# Patient Record
Sex: MALE | Race: WHITE | NOT HISPANIC OR LATINO | Employment: OTHER | URBAN - METROPOLITAN AREA
[De-identification: names, ages, dates, MRNs, and addresses within clinical notes are randomized per-mention and may not be internally consistent; named-entity substitution may affect disease eponyms.]

---

## 2022-12-02 ENCOUNTER — HOSPITAL ENCOUNTER (OUTPATIENT)
Facility: HOSPITAL | Age: 87
Discharge: NURSING, REHAB OR LONG-TERM CARE | End: 2022-12-08
Attending: EMERGENCY MEDICINE | Admitting: INTERNAL MEDICINE
Payer: MEDICARE

## 2022-12-02 DIAGNOSIS — R79.89 ELEVATED TROPONIN: ICD-10-CM

## 2022-12-02 DIAGNOSIS — R53.1 WEAKNESS: ICD-10-CM

## 2022-12-02 DIAGNOSIS — R07.9 CHEST PAIN: ICD-10-CM

## 2022-12-02 DIAGNOSIS — R74.8 ELEVATED CPK: ICD-10-CM

## 2022-12-02 DIAGNOSIS — I48.91 A-FIB: ICD-10-CM

## 2022-12-02 DIAGNOSIS — R55 SYNCOPE: ICD-10-CM

## 2022-12-02 DIAGNOSIS — R55 SYNCOPE, UNSPECIFIED SYNCOPE TYPE: Primary | ICD-10-CM

## 2022-12-02 PROBLEM — D51.9 ANEMIA DUE TO VITAMIN B12 DEFICIENCY: Status: ACTIVE | Noted: 2022-12-02

## 2022-12-02 PROBLEM — E03.9 HYPOTHYROIDISM: Chronic | Status: ACTIVE | Noted: 2022-12-02

## 2022-12-02 PROBLEM — N17.9 AKI (ACUTE KIDNEY INJURY): Status: ACTIVE | Noted: 2022-12-02

## 2022-12-02 PROBLEM — D64.9 ANEMIA: Chronic | Status: ACTIVE | Noted: 2022-12-02

## 2022-12-02 LAB
ALBUMIN SERPL BCP-MCNC: 3.1 G/DL (ref 3.5–5.2)
ALP SERPL-CCNC: 62 U/L (ref 55–135)
ALT SERPL W/O P-5'-P-CCNC: 16 U/L (ref 10–44)
ANION GAP SERPL CALC-SCNC: 13 MMOL/L (ref 8–16)
AST SERPL-CCNC: 82 U/L (ref 10–40)
BASOPHILS # BLD AUTO: 0.01 K/UL (ref 0–0.2)
BASOPHILS NFR BLD: 0.1 % (ref 0–1.9)
BILIRUB SERPL-MCNC: 1 MG/DL (ref 0.1–1)
BNP SERPL-MCNC: 408 PG/ML (ref 0–99)
BUN SERPL-MCNC: 48 MG/DL (ref 10–30)
CALCIUM SERPL-MCNC: 8.7 MG/DL (ref 8.7–10.5)
CHLORIDE SERPL-SCNC: 110 MMOL/L (ref 95–110)
CK SERPL-CCNC: 2714 U/L (ref 20–200)
CO2 SERPL-SCNC: 16 MMOL/L (ref 23–29)
CREAT SERPL-MCNC: 1.8 MG/DL (ref 0.5–1.4)
DIFFERENTIAL METHOD: ABNORMAL
EOSINOPHIL # BLD AUTO: 0 K/UL (ref 0–0.5)
EOSINOPHIL NFR BLD: 0 % (ref 0–8)
ERYTHROCYTE [DISTWIDTH] IN BLOOD BY AUTOMATED COUNT: 14.8 % (ref 11.5–14.5)
EST. GFR  (NO RACE VARIABLE): 34 ML/MIN/1.73 M^2
GLUCOSE SERPL-MCNC: 113 MG/DL (ref 70–110)
HCT VFR BLD AUTO: 31.3 % (ref 40–54)
HGB BLD-MCNC: 10.2 G/DL (ref 14–18)
IMM GRANULOCYTES # BLD AUTO: 0.02 K/UL (ref 0–0.04)
IMM GRANULOCYTES NFR BLD AUTO: 0.2 % (ref 0–0.5)
LYMPHOCYTES # BLD AUTO: 0.2 K/UL (ref 1–4.8)
LYMPHOCYTES NFR BLD: 2.8 % (ref 18–48)
MCH RBC QN AUTO: 30.2 PG (ref 27–31)
MCHC RBC AUTO-ENTMCNC: 32.6 G/DL (ref 32–36)
MCV RBC AUTO: 93 FL (ref 82–98)
MONOCYTES # BLD AUTO: 0.6 K/UL (ref 0.3–1)
MONOCYTES NFR BLD: 6.9 % (ref 4–15)
NEUTROPHILS # BLD AUTO: 7.8 K/UL (ref 1.8–7.7)
NEUTROPHILS NFR BLD: 90 % (ref 38–73)
NRBC BLD-RTO: 0 /100 WBC
PLATELET # BLD AUTO: 241 K/UL (ref 150–450)
PMV BLD AUTO: 10.6 FL (ref 9.2–12.9)
POTASSIUM SERPL-SCNC: 3.7 MMOL/L (ref 3.5–5.1)
PROT SERPL-MCNC: 6.4 G/DL (ref 6–8.4)
RBC # BLD AUTO: 3.38 M/UL (ref 4.6–6.2)
SODIUM SERPL-SCNC: 139 MMOL/L (ref 136–145)
TROPONIN I SERPL DL<=0.01 NG/ML-MCNC: 0.04 NG/ML (ref 0–0.03)
TROPONIN I SERPL DL<=0.01 NG/ML-MCNC: 0.04 NG/ML (ref 0–0.03)
TROPONIN I SERPL DL<=0.01 NG/ML-MCNC: 0.06 NG/ML (ref 0–0.03)
WBC # BLD AUTO: 8.7 K/UL (ref 3.9–12.7)

## 2022-12-02 PROCEDURE — 96361 HYDRATE IV INFUSION ADD-ON: CPT

## 2022-12-02 PROCEDURE — 25000003 PHARM REV CODE 250: Performed by: EMERGENCY MEDICINE

## 2022-12-02 PROCEDURE — 80053 COMPREHEN METABOLIC PANEL: CPT | Performed by: EMERGENCY MEDICINE

## 2022-12-02 PROCEDURE — 85025 COMPLETE CBC W/AUTO DIFF WBC: CPT | Performed by: EMERGENCY MEDICINE

## 2022-12-02 PROCEDURE — 93010 ELECTROCARDIOGRAM REPORT: CPT | Mod: ,,, | Performed by: STUDENT IN AN ORGANIZED HEALTH CARE EDUCATION/TRAINING PROGRAM

## 2022-12-02 PROCEDURE — 93010 EKG 12-LEAD: ICD-10-PCS | Mod: ,,, | Performed by: STUDENT IN AN ORGANIZED HEALTH CARE EDUCATION/TRAINING PROGRAM

## 2022-12-02 PROCEDURE — 99285 EMERGENCY DEPT VISIT HI MDM: CPT | Mod: 25,CS

## 2022-12-02 PROCEDURE — 84484 ASSAY OF TROPONIN QUANT: CPT | Performed by: EMERGENCY MEDICINE

## 2022-12-02 PROCEDURE — G0378 HOSPITAL OBSERVATION PER HR: HCPCS

## 2022-12-02 PROCEDURE — 63600175 PHARM REV CODE 636 W HCPCS: Performed by: INTERNAL MEDICINE

## 2022-12-02 PROCEDURE — 84484 ASSAY OF TROPONIN QUANT: CPT | Mod: 91 | Performed by: INTERNAL MEDICINE

## 2022-12-02 PROCEDURE — 93005 ELECTROCARDIOGRAM TRACING: CPT

## 2022-12-02 PROCEDURE — 36415 COLL VENOUS BLD VENIPUNCTURE: CPT | Performed by: INTERNAL MEDICINE

## 2022-12-02 PROCEDURE — 80061 LIPID PANEL: CPT | Performed by: INTERNAL MEDICINE

## 2022-12-02 PROCEDURE — 82550 ASSAY OF CK (CPK): CPT | Performed by: EMERGENCY MEDICINE

## 2022-12-02 PROCEDURE — 83880 ASSAY OF NATRIURETIC PEPTIDE: CPT | Performed by: EMERGENCY MEDICINE

## 2022-12-02 RX ORDER — LEVOTHYROXINE SODIUM 112 UG/1
112 TABLET ORAL
COMMUNITY

## 2022-12-02 RX ORDER — LANOLIN ALCOHOL/MO/W.PET/CERES
400 CREAM (GRAM) TOPICAL DAILY
Status: DISCONTINUED | OUTPATIENT
Start: 2022-12-03 | End: 2022-12-06

## 2022-12-02 RX ORDER — ERGOCALCIFEROL 1.25 MG/1
50000 CAPSULE ORAL
COMMUNITY

## 2022-12-02 RX ORDER — SODIUM CHLORIDE, SODIUM LACTATE, POTASSIUM CHLORIDE, CALCIUM CHLORIDE 600; 310; 30; 20 MG/100ML; MG/100ML; MG/100ML; MG/100ML
INJECTION, SOLUTION INTRAVENOUS CONTINUOUS
Status: DISCONTINUED | OUTPATIENT
Start: 2022-12-02 | End: 2022-12-07

## 2022-12-02 RX ORDER — LANOLIN ALCOHOL/MO/W.PET/CERES
400 CREAM (GRAM) TOPICAL DAILY
COMMUNITY

## 2022-12-02 RX ORDER — LEVOTHYROXINE SODIUM 112 UG/1
112 TABLET ORAL
Status: DISCONTINUED | OUTPATIENT
Start: 2022-12-03 | End: 2022-12-08 | Stop reason: HOSPADM

## 2022-12-02 RX ORDER — ACETAMINOPHEN 325 MG/1
650 TABLET ORAL EVERY 4 HOURS PRN
Status: DISCONTINUED | OUTPATIENT
Start: 2022-12-02 | End: 2022-12-08 | Stop reason: HOSPADM

## 2022-12-02 RX ADMIN — SODIUM CHLORIDE, POTASSIUM CHLORIDE, SODIUM LACTATE AND CALCIUM CHLORIDE: 600; 310; 30; 20 INJECTION, SOLUTION INTRAVENOUS at 09:12

## 2022-12-02 RX ADMIN — SODIUM CHLORIDE 1000 ML: 0.9 INJECTION, SOLUTION INTRAVENOUS at 04:12

## 2022-12-02 NOTE — ED PROVIDER NOTES
Encounter Date: 2022    SCRIBE #1 NOTE: I, My Venkat, am scribing for, and in the presence of,  Mykel Licea MD. I have scribed the entire note.     History     Chief Complaint   Patient presents with    Loss of Consciousness     Pt. Reports he as been passing out all day. EMS says pt. Was hypotensive on arrival.      EMS was called for weakness, and multiple syncopal events today.  When they arrived, systolic was 80.  Gave 1 liter of fluids, and BP improved to 140 systolic.  No complaints at present.      The history is provided by the patient.   Loss of Consciousness  This is a new problem. The current episode started less than 1 hour ago. The problem has been resolved. Pertinent negatives include no chest pain, no abdominal pain, no headaches and no shortness of breath.   Review of patient's allergies indicates:   Allergen Reactions    Azithromycin Other (See Comments)     not sure of reaction    Donepezil Other (See Comments)     Leg cramps, sedation     Past Medical History:   Diagnosis Date    COPD (chronic obstructive pulmonary disease)     Thyroid disease      Past Surgical History:   Procedure Laterality Date    APPENDECTOMY      SPINE SURGERY       Family History   Problem Relation Age of Onset    Heart failure Mother     Emphysema Father      Social History     Tobacco Use    Smoking status: Former     Types: Cigarettes     Quit date:      Years since quittin.9    Smokeless tobacco: Never   Substance Use Topics    Alcohol use: Not Currently    Drug use: Never     Review of Systems   Constitutional:  Negative for fever.   HENT:  Negative for sore throat.    Respiratory:  Negative for shortness of breath.    Cardiovascular:  Positive for syncope. Negative for chest pain.   Gastrointestinal:  Negative for abdominal pain and nausea.   Genitourinary:  Negative for dysuria.   Musculoskeletal:  Negative for back pain.   Skin:  Negative for rash.   Neurological:  Negative for weakness and headaches.    Hematological:  Does not bruise/bleed easily.     Physical Exam     Initial Vitals [12/02/22 1428]   BP Pulse Resp Temp SpO2   (!) 140/80 74 18 97.9 °F (36.6 °C) 96 %      MAP       --         Physical Exam    Nursing note and vitals reviewed.  Constitutional: He appears well-developed and well-nourished. No distress.   Elderly and frail.   HENT:   Head: Normocephalic and atraumatic.   Mouth/Throat: Oropharynx is clear and moist.   Eyes: Conjunctivae and EOM are normal. Pupils are equal, round, and reactive to light.   Neck: Neck supple.   Normal range of motion.  Cardiovascular:  Normal rate, regular rhythm and normal heart sounds.     Exam reveals no gallop and no friction rub.       No murmur heard.  Pulmonary/Chest: Breath sounds normal. No respiratory distress. He has no wheezes. He has no rhonchi. He has no rales.   Abdominal: Abdomen is soft. Bowel sounds are normal. He exhibits no distension and no mass. There is no abdominal tenderness. There is no rebound and no guarding.   Musculoskeletal:         General: No edema. Normal range of motion.      Cervical back: Normal range of motion and neck supple.     Neurological: He is alert and oriented to person, place, and time. He has normal strength.   Skin: Skin is warm and dry. No rash noted.   Psychiatric: He has a normal mood and affect. Thought content normal.       ED Course   Procedures  Labs Reviewed   CBC W/ AUTO DIFFERENTIAL - Abnormal; Notable for the following components:       Result Value    RBC 3.38 (*)     Hemoglobin 10.2 (*)     Hematocrit 31.3 (*)     RDW 14.8 (*)     Gran # (ANC) 7.8 (*)     Lymph # 0.2 (*)     Gran % 90.0 (*)     Lymph % 2.8 (*)     All other components within normal limits   COMPREHENSIVE METABOLIC PANEL - Abnormal; Notable for the following components:    CO2 16 (*)     Glucose 113 (*)     BUN 48 (*)     Creatinine 1.8 (*)     Albumin 3.1 (*)     AST 82 (*)     eGFR 34 (*)     All other components within normal limits    B-TYPE NATRIURETIC PEPTIDE - Abnormal; Notable for the following components:     (*)     All other components within normal limits   CK - Abnormal; Notable for the following components:    CPK 2714 (*)     All other components within normal limits   TROPONIN I - Abnormal; Notable for the following components:    Troponin I 0.058 (*)     All other components within normal limits   URINALYSIS, REFLEX TO URINE CULTURE   LIPID PANEL     EKG Readings: (Independently Interpreted)   Rhythm: Normal Sinus Rhythm. Heart Rate: 76. Ectopy: No Ectopy. Conduction: Normal. ST Segments: Normal ST Segments. T Waves: Normal. Clinical Impression: Normal Sinus Rhythm   ECG Results              EKG 12-lead (In process)  Result time 12/02/22 15:25:02      In process by Interface, Lab In [facility] (12/02/22 15:25:02)                   Narrative:    Test Reason : R53.1,    Vent. Rate : 076 BPM     Atrial Rate : 076 BPM     P-R Int : 162 ms          QRS Dur : 084 ms      QT Int : 402 ms       P-R-T Axes : 029 090 034 degrees     QTc Int : 452 ms    Undetermined rhythm  Rightward axis  Borderline Abnormal ECG  No previous ECGs available    Referred By: [email or web address]   SELF           Confirmed By:                                   Imaging Results              CT Head Without Contrast (Final result)  Result time 12/02/22 16:52:27      Final result by Tao Huerta MD (12/02/22 16:52:27)                   Impression:      No acute abnormality.    All CT scans at this facility are performed  using dose modulation techniques as appropriate to performed exam including the following:  automated exposure control; adjustment of mA and/or kV according to the patients size (this includes techniques or standardized protocols for targeted exams where dose is matched to indication/reason for exam: i.e. extremities or head);  iterative reconstruction technique.      Electronically signed by: Tao Huerta  Date:    12/02/2022  Time:    16:52                Narrative:    EXAMINATION:  CT HEAD WITHOUT CONTRAST    CLINICAL HISTORY:  Syncope, recurrent;    TECHNIQUE:  Low dose axial CT images obtained throughout the head without intravenous contrast. Sagittal and coronal reconstructions were performed.    COMPARISON:  None.    FINDINGS:  Intracranial compartment:    Motion degrades the evaluation.    Ventricles and sulci are normal in size for age without evidence of hydrocephalus. No extra-axial blood or fluid collections.    Mild microvascular ischemic change.  No parenchymal mass, hemorrhage, edema or major vascular distribution infarct.    Skull/extracranial contents (limited evaluation): No fracture. Mastoid air cells and paranasal sinuses are essentially clear.                                       X-Ray Chest AP Portable (Final result)  Result time 12/02/22 15:25:06      Final result by JOÃO Travis Sr., MD (12/02/22 15:25:06)                   Impression:      1. This is a limited examination secondary to the lack of inclusion of the entire thorax on the film. The left costophrenic angle was not included on the film.  2. There is no focal pulmonary infiltrate visualized.  3. There is a moderate amount of atherosclerosis.  .      Electronically signed by: Franklin Travis MD  Date:    12/02/2022  Time:    15:25               Narrative:    EXAMINATION:  XR CHEST AP PORTABLE    CLINICAL HISTORY:  weakness;    COMPARISON:  None    FINDINGS:  This is a limited examination secondary to the lack of inclusion of the entire thorax on the film.  There is no focal pulmonary infiltrate visualized.  The size of the heart is normal.  There is a moderate amount of atherosclerosis.  There is no pneumothorax.  The right costophrenic angle is sharp.  The left costophrenic angle was not included on the film.                                       Medications   acetaminophen tablet 650 mg (has no administration in time range)   lactated ringers infusion ( Intravenous New  Bag 12/2/22 5929)   levothyroxine tablet 112 mcg (has no administration in time range)   magnesium oxide tablet 400 mg (has no administration in time range)   sodium chloride 0.9% bolus 1,000 mL (0 mLs Intravenous Stopped 12/2/22 1714)     Medical Decision Making:   Clinical Tests:   Lab Tests: Ordered and Reviewed  Radiological Study: Ordered and Reviewed  Medical Tests: Ordered and ReviewedLab results:  Results for orders placed or performed during the hospital encounter of 12/02/22   CBC Auto Differential   Result Value Ref Range    WBC 8.70 3.90 - 12.70 K/uL    RBC 3.38 (L) 4.60 - 6.20 M/uL    Hemoglobin 10.2 (L) 14.0 - 18.0 g/dL    Hematocrit 31.3 (L) 40.0 - 54.0 %    MCV 93 82 - 98 fL    MCH 30.2 27.0 - 31.0 pg    MCHC 32.6 32.0 - 36.0 g/dL    RDW 14.8 (H) 11.5 - 14.5 %    Platelets 241 150 - 450 K/uL    MPV 10.6 9.2 - 12.9 fL    Immature Granulocytes 0.2 0.0 - 0.5 %    Gran # (ANC) 7.8 (H) 1.8 - 7.7 K/uL    Immature Grans (Abs) 0.02 0.00 - 0.04 K/uL    Lymph # 0.2 (L) 1.0 - 4.8 K/uL    Mono # 0.6 0.3 - 1.0 K/uL    Eos # 0.0 0.0 - 0.5 K/uL    Baso # 0.01 0.00 - 0.20 K/uL    nRBC 0 0 /100 WBC    Gran % 90.0 (H) 38.0 - 73.0 %    Lymph % 2.8 (L) 18.0 - 48.0 %    Mono % 6.9 4.0 - 15.0 %    Eosinophil % 0.0 0.0 - 8.0 %    Basophil % 0.1 0.0 - 1.9 %    Differential Method Automated    Comprehensive Metabolic Panel   Result Value Ref Range    Sodium 139 136 - 145 mmol/L    Potassium 3.7 3.5 - 5.1 mmol/L    Chloride 110 95 - 110 mmol/L    CO2 16 (L) 23 - 29 mmol/L    Glucose 113 (H) 70 - 110 mg/dL    BUN 48 (H) 10 - 30 mg/dL    Creatinine 1.8 (H) 0.5 - 1.4 mg/dL    Calcium 8.7 8.7 - 10.5 mg/dL    Total Protein 6.4 6.0 - 8.4 g/dL    Albumin 3.1 (L) 3.5 - 5.2 g/dL    Total Bilirubin 1.0 0.1 - 1.0 mg/dL    Alkaline Phosphatase 62 55 - 135 U/L    AST 82 (H) 10 - 40 U/L    ALT 16 10 - 44 U/L    Anion Gap 13 8 - 16 mmol/L    eGFR 34 (A) >60 mL/min/1.73 m^2   BNP   Result Value Ref Range     (H) 0 - 99 pg/mL   CK    Result Value Ref Range    CPK 2714 (H) 20 - 200 U/L   Troponin I   Result Value Ref Range    Troponin I 0.058 (H) 0.000 - 0.026 ng/mL   Troponin I   Result Value Ref Range    Troponin I 0.042 (H) 0.000 - 0.026 ng/mL   Troponin I   Result Value Ref Range    Troponin I 0.044 (H) 0.000 - 0.026 ng/mL      5:23 PM: Discussed case with Dr. Joesph MD ([facility]). Dr. Pink agrees with current care and management of pt and accepts admission.   Admitting Service: Hospital Medicine  Admitting Physician: Dr. Pink  Admit to: Obs  5:24 PM: Re-evaluated pt. Pt is resting comfortably and is in no acute distress.  Pt states .  D/w pt all pertinent results. D/w pt any concerns expressed at this time. Answered all questions. Pt expresses understanding at this time.       Scribe Attestation:   Scribe #1: I performed the above scribed service and the documentation accurately describes the services I performed. I attest to the accuracy of the note.    Attending Attestation:           Physician Attestation for Scribe:  Physician Attestation Statement for Scribe #1: I, Mykel Licea MD, reviewed documentation, as scribed by My Venkat in my presence, and it is both accurate and complete.                        Clinical Impression:   Final diagnoses:  [R53.1] Weakness  [R55] Syncope, unspecified syncope type (Primary)  [R77.8] Elevated troponin  [R74.8] Elevated CPK        ED Disposition Condition    Observation Stable                Mykel Licea MD  12/03/22 0150

## 2022-12-02 NOTE — Clinical Note
Diagnosis: Weakness [[id number]]   Future Attending Provider: BETH MOSQUEDA [[id number]]   Admitting Provider:: BETH MOSQUEDA [[id number]]  
no

## 2022-12-03 LAB
ALBUMIN SERPL BCP-MCNC: 3 G/DL (ref 3.5–5.2)
ALP SERPL-CCNC: 63 U/L (ref 55–135)
ALT SERPL W/O P-5'-P-CCNC: 17 U/L (ref 10–44)
ANION GAP SERPL CALC-SCNC: 12 MMOL/L (ref 8–16)
AORTIC ROOT ANNULUS: 3.7 CM
ASCENDING AORTA: 3.99 CM
AST SERPL-CCNC: 62 U/L (ref 10–40)
AV INDEX (PROSTH): 0.78
AV MEAN GRADIENT: 9 MMHG
AV PEAK GRADIENT: 16 MMHG
AV REGURGITATION PRESSURE HALF TIME: 568.2 MS
AV VALVE AREA: 2.79 CM2
AV VELOCITY RATIO: 0.74
BACTERIA #/AREA URNS HPF: ABNORMAL /HPF
BASOPHILS # BLD AUTO: 0.01 K/UL (ref 0–0.2)
BASOPHILS NFR BLD: 0.1 % (ref 0–1.9)
BILIRUB SERPL-MCNC: 0.9 MG/DL (ref 0.1–1)
BILIRUB UR QL STRIP: NEGATIVE
BSA FOR ECHO PROCEDURE: 1.82 M2
BUN SERPL-MCNC: 43 MG/DL (ref 10–30)
CALCIUM SERPL-MCNC: 8.5 MG/DL (ref 8.7–10.5)
CHLORIDE SERPL-SCNC: 107 MMOL/L (ref 95–110)
CHOLEST SERPL-MCNC: 138 MG/DL (ref 120–199)
CHOLEST SERPL-MCNC: 147 MG/DL (ref 120–199)
CHOLEST/HDLC SERPL: 3.6 {RATIO} (ref 2–5)
CHOLEST/HDLC SERPL: 4.3 {RATIO} (ref 2–5)
CK SERPL-CCNC: 1129 U/L (ref 20–200)
CLARITY UR: CLEAR
CO2 SERPL-SCNC: 20 MMOL/L (ref 23–29)
COLOR UR: YELLOW
CREAT SERPL-MCNC: 1.6 MG/DL (ref 0.5–1.4)
CV ECHO LV RWT: 1.05 CM
DIFFERENTIAL METHOD: ABNORMAL
DOP CALC AO PEAK VEL: 2.02 M/S
DOP CALC AO VTI: 43.3 CM
DOP CALC LVOT AREA: 3.6 CM2
DOP CALC LVOT DIAMETER: 2.13 CM
DOP CALC LVOT PEAK VEL: 1.5 M/S
DOP CALC LVOT STROKE VOLUME: 120.73 CM3
DOP CALC RVOT PEAK VEL: 1.21 M/S
DOP CALC RVOT VTI: 26.3 CM
DOP CALCLVOT PEAK VEL VTI: 33.9 CM
E WAVE DECELERATION TIME: 311.18 MSEC
E/A RATIO: 0.9
ECHO LV POSTERIOR WALL: 1.2 CM (ref 0.6–1.1)
EJECTION FRACTION: 60 %
EOSINOPHIL # BLD AUTO: 0 K/UL (ref 0–0.5)
EOSINOPHIL NFR BLD: 0 % (ref 0–8)
ERYTHROCYTE [DISTWIDTH] IN BLOOD BY AUTOMATED COUNT: 14.7 % (ref 11.5–14.5)
EST. GFR  (NO RACE VARIABLE): 39 ML/MIN/1.73 M^2
FRACTIONAL SHORTENING: 28 % (ref 28–44)
GLUCOSE SERPL-MCNC: 96 MG/DL (ref 70–110)
GLUCOSE UR QL STRIP: NEGATIVE
GRAN CASTS #/AREA URNS LPF: 3 /LPF
HCT VFR BLD AUTO: 28.4 % (ref 40–54)
HDLC SERPL-MCNC: 32 MG/DL (ref 40–75)
HDLC SERPL-MCNC: 41 MG/DL (ref 40–75)
HDLC SERPL: 23.2 % (ref 20–50)
HDLC SERPL: 27.9 % (ref 20–50)
HGB BLD-MCNC: 9.4 G/DL (ref 14–18)
HGB UR QL STRIP: ABNORMAL
HYALINE CASTS #/AREA URNS LPF: 0 /LPF
IMM GRANULOCYTES # BLD AUTO: 0.03 K/UL (ref 0–0.04)
IMM GRANULOCYTES NFR BLD AUTO: 0.4 % (ref 0–0.5)
INTERVENTRICULAR SEPTUM: 1.39 CM (ref 0.6–1.1)
IVRT: 122.74 MSEC
KETONES UR QL STRIP: NEGATIVE
LA MAJOR: 7.75 CM
LA MINOR: 6.16 CM
LA WIDTH: 4.5 CM
LDLC SERPL CALC-MCNC: 91.2 MG/DL (ref 63–159)
LDLC SERPL CALC-MCNC: 92.2 MG/DL (ref 63–159)
LEFT ATRIUM SIZE: 2.68 CM
LEFT ATRIUM VOLUME INDEX: 38.7 ML/M2
LEFT ATRIUM VOLUME: 70.36 CM3
LEFT INTERNAL DIMENSION IN SYSTOLE: 1.64 CM (ref 2.1–4)
LEFT VENTRICLE DIASTOLIC VOLUME INDEX: 9.77 ML/M2
LEFT VENTRICLE DIASTOLIC VOLUME: 17.78 ML
LEFT VENTRICLE MASS INDEX: 48 G/M2
LEFT VENTRICLE SYSTOLIC VOLUME INDEX: 4.2 ML/M2
LEFT VENTRICLE SYSTOLIC VOLUME: 7.69 ML
LEFT VENTRICULAR INTERNAL DIMENSION IN DIASTOLE: 2.28 CM (ref 3.5–6)
LEFT VENTRICULAR MASS: 86.84 G
LEUKOCYTE ESTERASE UR QL STRIP: NEGATIVE
LVOT MG: 5.92 MMHG
LVOT MV: 1.15 CM/S
LYMPHOCYTES # BLD AUTO: 0.4 K/UL (ref 1–4.8)
LYMPHOCYTES NFR BLD: 5.2 % (ref 18–48)
MCH RBC QN AUTO: 30.7 PG (ref 27–31)
MCHC RBC AUTO-ENTMCNC: 33.1 G/DL (ref 32–36)
MCV RBC AUTO: 93 FL (ref 82–98)
MICROSCOPIC COMMENT: ABNORMAL
MONOCYTES # BLD AUTO: 0.5 K/UL (ref 0.3–1)
MONOCYTES NFR BLD: 8 % (ref 4–15)
MV PEAK A VEL: 0.94 M/S
MV PEAK E VEL: 0.85 M/S
MV STENOSIS PRESSURE HALF TIME: 90.24 MS
MV VALVE AREA P 1/2 METHOD: 2.44 CM2
NEUTROPHILS # BLD AUTO: 5.8 K/UL (ref 1.8–7.7)
NEUTROPHILS NFR BLD: 86.3 % (ref 38–73)
NITRITE UR QL STRIP: NEGATIVE
NONHDLC SERPL-MCNC: 106 MG/DL
NONHDLC SERPL-MCNC: 106 MG/DL
NRBC BLD-RTO: 0 /100 WBC
PH UR STRIP: 6 [PH] (ref 5–8)
PISA AR MAX VEL: 3.67 M/S
PISA TR MAX VEL: 3.54 M/S
PLATELET # BLD AUTO: 189 K/UL (ref 150–450)
PMV BLD AUTO: 10.5 FL (ref 9.2–12.9)
POTASSIUM SERPL-SCNC: 3.8 MMOL/L (ref 3.5–5.1)
PROT SERPL-MCNC: 6 G/DL (ref 6–8.4)
PROT UR QL STRIP: ABNORMAL
PULM VEIN S/D RATIO: 1.51
PV MEAN GRADIENT: 2.93 MMHG
PV MV: 0.85 M/S
PV PEAK D VEL: 0.39 M/S
PV PEAK S VEL: 0.59 M/S
PV PEAK VELOCITY: 1.47 CM/S
RA MAJOR: 5.19 CM
RBC # BLD AUTO: 3.06 M/UL (ref 4.6–6.2)
RBC #/AREA URNS HPF: 4 /HPF (ref 0–4)
RIGHT VENTRICULAR END-DIASTOLIC DIMENSION: 3.1 CM
SODIUM SERPL-SCNC: 139 MMOL/L (ref 136–145)
SP GR UR STRIP: 1.02 (ref 1–1.03)
SQUAMOUS #/AREA URNS HPF: 2 /HPF
STJ: 3.76 CM
TR MAX PG: 50 MMHG
TRICUSPID ANNULAR PLANE SYSTOLIC EXCURSION: 2.1 CM
TRIGL SERPL-MCNC: 69 MG/DL (ref 30–150)
TRIGL SERPL-MCNC: 74 MG/DL (ref 30–150)
TROPONIN I SERPL DL<=0.01 NG/ML-MCNC: 0.04 NG/ML (ref 0–0.03)
URN SPEC COLLECT METH UR: ABNORMAL
UROBILINOGEN UR STRIP-ACNC: NEGATIVE EU/DL
WBC # BLD AUTO: 6.71 K/UL (ref 3.9–12.7)
WBC #/AREA URNS HPF: 3 /HPF (ref 0–5)
WBC CLUMPS URNS QL MICRO: ABNORMAL

## 2022-12-03 PROCEDURE — G0378 HOSPITAL OBSERVATION PER HR: HCPCS

## 2022-12-03 PROCEDURE — 97162 PT EVAL MOD COMPLEX 30 MIN: CPT

## 2022-12-03 PROCEDURE — 82550 ASSAY OF CK (CPK): CPT | Performed by: NURSE PRACTITIONER

## 2022-12-03 PROCEDURE — 92610 EVALUATE SWALLOWING FUNCTION: CPT

## 2022-12-03 PROCEDURE — 36415 COLL VENOUS BLD VENIPUNCTURE: CPT | Performed by: NURSE PRACTITIONER

## 2022-12-03 PROCEDURE — 80053 COMPREHEN METABOLIC PANEL: CPT | Performed by: INTERNAL MEDICINE

## 2022-12-03 PROCEDURE — 36415 COLL VENOUS BLD VENIPUNCTURE: CPT | Performed by: INTERNAL MEDICINE

## 2022-12-03 PROCEDURE — 97530 THERAPEUTIC ACTIVITIES: CPT

## 2022-12-03 PROCEDURE — 84484 ASSAY OF TROPONIN QUANT: CPT | Performed by: INTERNAL MEDICINE

## 2022-12-03 PROCEDURE — 80061 LIPID PANEL: CPT | Performed by: INTERNAL MEDICINE

## 2022-12-03 PROCEDURE — 97167 OT EVAL HIGH COMPLEX 60 MIN: CPT

## 2022-12-03 PROCEDURE — 96361 HYDRATE IV INFUSION ADD-ON: CPT

## 2022-12-03 PROCEDURE — 81000 URINALYSIS NONAUTO W/SCOPE: CPT | Performed by: EMERGENCY MEDICINE

## 2022-12-03 PROCEDURE — 25000003 PHARM REV CODE 250: Performed by: INTERNAL MEDICINE

## 2022-12-03 PROCEDURE — 85025 COMPLETE CBC W/AUTO DIFF WBC: CPT | Performed by: INTERNAL MEDICINE

## 2022-12-03 PROCEDURE — 63600175 PHARM REV CODE 636 W HCPCS: Performed by: INTERNAL MEDICINE

## 2022-12-03 RX ADMIN — LEVOTHYROXINE SODIUM 112 MCG: 0.11 TABLET ORAL at 05:12

## 2022-12-03 RX ADMIN — SODIUM CHLORIDE, POTASSIUM CHLORIDE, SODIUM LACTATE AND CALCIUM CHLORIDE: 600; 310; 30; 20 INJECTION, SOLUTION INTRAVENOUS at 04:12

## 2022-12-03 RX ADMIN — Medication 400 MG: at 08:12

## 2022-12-03 NOTE — ASSESSMENT & PLAN NOTE
Unwitnessed, unclear etiology  -Orthostatic VS every shift  -Neuro checks every 4 hours  -Serial troponin levels trending down  - ECHO pending

## 2022-12-03 NOTE — ASSESSMENT & PLAN NOTE
Patient with history of dementia as chart review  Family reports that patient has had increasing moments of confusion recently, however still lucid most of the time  Apparently had previously been on donepezil  Supportive care   Delirium precautions  PT/OT/SLP

## 2022-12-03 NOTE — SUBJECTIVE & OBJECTIVE
Past Medical History:   Diagnosis Date    COPD (chronic obstructive pulmonary disease)     Thyroid disease        Past Surgical History:   Procedure Laterality Date    APPENDECTOMY      SPINE SURGERY         Review of patient's allergies indicates:   Allergen Reactions    Azithromycin Other (See Comments)     not sure of reaction    Donepezil Other (See Comments)     Leg cramps, sedation       No current facility-administered medications on file prior to encounter.     Current Outpatient Medications on File Prior to Encounter   Medication Sig    CYANOCOBALAMIN, VITAMIN B-12, INJ Inject 1,000 mcg into the muscle every 30 days.    ergocalciferol (ERGOCALCIFEROL) 50,000 unit Cap Take 50,000 Units by mouth every 7 days.    levothyroxine (SYNTHROID) 112 MCG tablet Take 112 mcg by mouth before breakfast.    magnesium oxide (MAG-OX) 400 mg (241.3 mg magnesium) tablet Take 400 mg by mouth once daily.     Family History       Problem Relation (Age of Onset)    Emphysema Father    Heart failure Mother          Tobacco Use    Smoking status: Former     Types: Cigarettes     Quit date:      Years since quittin.9    Smokeless tobacco: Never   Substance and Sexual Activity    Alcohol use: Not Currently    Drug use: Never    Sexual activity: Not Currently     Review of Systems   Unable to perform ROS: Other  Hughes  Objective:     Vital Signs (Most Recent):  Temp: 97.9 °F (36.6 °C) (22 1428)  Pulse: 100 (22)  Resp: 18 (22)  BP: (!) 125/91 (22)  SpO2: 97 % (22)   Vital Signs (24h Range):  Temp:  [97.9 °F (36.6 °C)] 97.9 °F (36.6 °C)  Pulse:  [] 100  Resp:  [17-20] 18  SpO2:  [92 %-97 %] 97 %  BP: (114-141)/(71-91) 125/91     Weight: 63.5 kg (140 lb)  Body mass index is 21.29 kg/m².    Physical Exam  Vitals and nursing note reviewed. Exam conducted with a chaperone present.   Constitutional:       General: He is not in acute distress.  HENT:      Head: Normocephalic and  atraumatic.      Right Ear: External ear normal.      Left Ear: External ear normal.      Nose: Nose normal.      Mouth/Throat:      Mouth: Mucous membranes are dry.   Eyes:      Extraocular Movements: Extraocular movements intact.      Pupils: Pupils are equal, round, and reactive to light.   Cardiovascular:      Rate and Rhythm: Normal rate and regular rhythm.   Pulmonary:      Effort: Pulmonary effort is normal. No respiratory distress.      Breath sounds: No wheezing.      Comments: On room air  Abdominal:      General: Bowel sounds are normal.      Palpations: Abdomen is soft.      Tenderness: There is no abdominal tenderness. There is no guarding or rebound.   Musculoskeletal:      Cervical back: Neck supple.      Right lower leg: No edema.      Left lower leg: No edema.      Comments: Venous stasis changes on bilateral lower extremities   Skin:     General: Skin is warm and dry.      Comments: Abrasion on right elbow, with scab.  Multiple abrasions on lower extremities   Neurological:      Mental Status: He is alert. Mental status is at baseline.      Comments: Occasionally goes on tangent well conversing   Psychiatric:         Mood and Affect: Mood normal.         CRANIAL NERVES     CN III, IV, VI   Pupils are equal, round, and reactive to light.     Significant Labs: All pertinent labs within the past 24 hours have been reviewed.  CBC:   Recent Labs   Lab 12/02/22  1510   WBC 8.70   HGB 10.2*   HCT 31.3*        CMP:   Recent Labs   Lab 12/02/22  1510      K 3.7      CO2 16*   *   BUN 48*   CREATININE 1.8*   CALCIUM 8.7   PROT 6.4   ALBUMIN 3.1*   BILITOT 1.0   ALKPHOS 62   AST 82*   ALT 16   ANIONGAP 13     Troponin:   Recent Labs   Lab 12/02/22  1510   TROPONINI 0.058*       Significant Imaging: I have reviewed all pertinent imaging results/findings within the past 24 hours.

## 2022-12-03 NOTE — SUBJECTIVE & OBJECTIVE
Review of Systems   Constitutional: Negative.    HENT: Negative.     Eyes: Negative.    Respiratory: Negative.     Cardiovascular: Negative.    Gastrointestinal: Negative.    Endocrine: Negative.    Genitourinary:  Positive for flank pain (right side).   Musculoskeletal:         + left knee pain    Skin: Negative.    Allergic/Immunologic: Negative.    Neurological:  Positive for dizziness and weakness.   Hematological: Negative.    Psychiatric/Behavioral: Negative.     Objective:     Vital Signs (Most Recent):  Temp: 98.1 °F (36.7 °C) (12/03/22 1127)  Pulse: 62 (12/03/22 1127)  Resp: 18 (12/03/22 1127)  BP: 126/88 (12/03/22 1127)  SpO2: (!) 94 % (12/03/22 1127)   Vital Signs (24h Range):  Temp:  [97.5 °F (36.4 °C)-98.1 °F (36.7 °C)] 98.1 °F (36.7 °C)  Pulse:  [] 62  Resp:  [17-20] 18  SpO2:  [92 %-97 %] 94 %  BP: (114-141)/(71-91) 126/88     Weight: 69.4 kg (153 lb)  Body mass index is 23.26 kg/m².    Intake/Output Summary (Last 24 hours) at 12/3/2022 1326  Last data filed at 12/2/2022 1713  Gross per 24 hour   Intake 999 ml   Output --   Net 999 ml      Physical Exam  Vitals and nursing note reviewed. Exam conducted with a chaperone present.   Constitutional:       General: He is not in acute distress.  HENT:      Head: Normocephalic and atraumatic.   Eyes:      Extraocular Movements: Extraocular movements intact.      Pupils: Pupils are equal, round, and reactive to light.   Cardiovascular:      Rate and Rhythm: Normal rate and regular rhythm.   Pulmonary:      Effort: Pulmonary effort is normal. No respiratory distress.      Breath sounds: No wheezing.   Abdominal:      General: Bowel sounds are normal.      Palpations: Abdomen is soft.      Tenderness: There is no abdominal tenderness. There is no guarding or rebound.   Musculoskeletal:      Cervical back: Neck supple.      Right lower leg: No edema.      Left lower leg: No edema.      Comments: Venous stasis changes on bilateral lower extremities    Skin:     General: Skin is warm and dry.      Comments: Abrasion on right elbow, with scab.  Multiple abrasions on lower extremities   Neurological:      Mental Status: He is alert. Mental status is at baseline.   Psychiatric:         Mood and Affect: Mood normal.       Significant Labs: All pertinent labs within the past 24 hours have been reviewed.  BMP:   Recent Labs   Lab 12/03/22  0543   GLU 96      K 3.8      CO2 20*   BUN 43*   CREATININE 1.6*   CALCIUM 8.5*     CBC:   Recent Labs   Lab 12/02/22  1510 12/03/22  0543   WBC 8.70 6.71   HGB 10.2* 9.4*   HCT 31.3* 28.4*    189     CMP:   Recent Labs   Lab 12/02/22  1510 12/03/22  0543    139   K 3.7 3.8    107   CO2 16* 20*   * 96   BUN 48* 43*   CREATININE 1.8* 1.6*   CALCIUM 8.7 8.5*   PROT 6.4 6.0   ALBUMIN 3.1* 3.0*   BILITOT 1.0 0.9   ALKPHOS 62 63   AST 82* 62*   ALT 16 17   ANIONGAP 13 12     Troponin:   Recent Labs   Lab 12/02/22 1952 12/02/22 2212 12/03/22  0543   TROPONINI 0.042* 0.044* 0.036*     Urine Studies: No results for input(s): COLORU, APPEARANCEUA, PHUR, SPECGRAV, PROTEINUA, GLUCUA, KETONESU, BILIRUBINUA, OCCULTUA, NITRITE, UROBILINOGEN, LEUKOCYTESUR, RBCUA, WBCUA, BACTERIA, SQUAMEPITHEL, HYALINECASTS in the last 48 hours.    Invalid input(s): JOSE    Significant Imaging: I have reviewed all pertinent imaging results/findings within the past 24 hours.

## 2022-12-03 NOTE — ASSESSMENT & PLAN NOTE
Patient with acute kidney injury likely due to IVVD/dehydration PER is currently stable. Labs reviewed- Renal function/electrolytes with Estimated Creatinine Clearance: 20.6 mL/min (A) (based on SCr of 1.8 mg/dL (H)). according to latest data.  Additionally CPK noted to be elevated at 2714.  Monitor urine output and serial BMP and adjust therapy as needed. Avoid nephrotoxins and renally dose meds for GFR listed above.   Gentle IV fluid  Strict Is&Os

## 2022-12-03 NOTE — PLAN OF CARE
OT EVAL COMPLETE.  PATIENT WOULD BENEFIT FROM CONT SKILLED OT INTERVENTION.  PATIENT AND SON SEEKING ALTERNATE LIVING OPTIONS.  SON STATED THEY ARE GETTING HELP FROM [facility] TO LEARNING OF LIVING OPTIONS.

## 2022-12-03 NOTE — PT/OT/SLP EVAL
Occupational Therapy   Evaluation    Name: Yehuda Sharma  MRN: [id number]  Admitting Diagnosis:  Syncope  Recent Surgery: * No surgery found *      Recommendations:     Discharge Recommendations:  ([facility] (A)'ING FAMILY WITH ALTERNATE LIVING OPTIONS FOR PATIENT.)  Discharge Equipment Recommendations:   (TBD)  Barriers to discharge:  None    Assessment:     Yehuda Sharma is a 89 y.o. male with a medical diagnosis of Syncope.  He presents with SELF CARE DEBILITY . Performance deficits affecting function: weakness, impaired endurance, impaired self care skills, impaired functional mobility, gait instability, impaired balance, decreased upper extremity function, decreased lower extremity function, decreased safety awareness, pain, impaired coordination (PATIENT HEARING IMPAIRED.).      Rehab Prognosis: Good; patient would benefit from acute skilled OT services to address these deficits and reach maximum level of function.       Plan:     Patient to be seen 2 x/week to address the above listed problems via self-care/home management, therapeutic activities, therapeutic exercises  Plan of Care Expires: 12/17/22  Plan of Care Reviewed with: patient, son    Subjective     Chief Complaint: BACK PAIN  Patient/Family Comments/goals: TO FIND ALTERNATE LIVING ARRANGEMENTS.    Occupational Profile:  Living Environment: PATIENT LIVES ALONE BUT SON LIVES NEARBY.  Previous level of function: S[facility] ST[facility] PATIENT WAS MOD (I) WITH ALL LEVELS OF SELF CARE AND WAS ABLE TO PREPARE SOME MEALS.  PATIENT WAS NOT DRIVING.   Roles and Routines: PATIENT PREPARED SOME MEALS.  FAMILY (A)'ED WITH MEALS AND HOUSE KEEPING.  Equipment Used at Home:  rollator, shower chair, grab bar  Assistance upon Discharge: FAMILY    Pain/Comfort:  Pain Rating 1: 4/10  Location - Side 1: Bilateral  Location 1: back  Pain Addressed 1: Reposition, Nurse notified    Patients cultural, spiritual, Amish conflicts given the current situation:       Objective:     Communicated with: NURSE prior to session.  Patient found supine with peripheral IV, telemetry upon OT entry to room.    General Precautions: Standard, aspiration, fall   Orthopedic Precautions:    Braces:    Respiratory Status: Room air    Occupational Performance:    Bed Mobility:    MAX (A) WITH ALL LEVELS OF BED MOBILITY DUE TO REPEATED C/O BACK PAIN.    Functional Mobility/Transfers:  UNSAFE TO PERFORM THIS DATE DUE TO PATIENT WITH REPEATED C/O BACK PAIN DEMO'ING LIMITED MOBILITY.    Activities of Daily Living:  PATIENT CURRENTLY MAX (A) WITH UB DRESSING AND (D) WITH LB DRESSING.    Cognitive/Visual Perceptual: PATIENT WEARS CORRECTIVE LENSES.  PATIENT NOTED TO REPEAT THE SAME QUESTION OR COMMENT.       Physical Exam:  Balance:    -       POOR STATIC SITTING BALANCE EOB.  Upper Extremity Range of Motion:    PATIENT WITH DECREASED (B) SHLD AROM.  BUE AROM WFL DISTALLY.  (B) HANDS WITH ARTHRITIC DEFORMITIES.    AMPAC 6 Click ADL:  AMPAC Total Score: 9    Treatment & Education:  OT EVAL PERFORMED.  PATIENT EDUCATED RE:  PURPOSE OF OT.  PATIENT PARTICIPATED IN FUNC MOBILITY AND SELF CARE TASKS     Patient left right sidelying with all lines intact, call button in reach, and SON AND NURSE present    GOALS:   Multidisciplinary Problems       Occupational Therapy Goals          Problem: Occupational Therapy    Goal Priority Disciplines Outcome Interventions   Occupational Therapy Goal     OT, PT/OT     Description: LTG'S TO BE MET IN 14 DAYS (12/17/22)    1)  PATIENT WILL PERFORM UB DRESSING WITH MIN (A) TO DECREASE (D) ON CAREGIVER.     2)  PATIENT WILL PERFORM LB DRESSING WITH MOD (A) TO DECREASE (D) ON CAREGIVER..    3)  PATIENT WILL PERFORM TOILET T/F WITH MIN (A) TO DECREASE (D) ON CAREGIVER..    4)  PATIENT WILL PERFORM BUE HEP FOR INCREASED FUNC STRENGTH AND ENDURANCE WITH MOD VC FOR PROPER TECHNIQUE TO DECREASE (D) ON CAREGIVER.                        History:     Past Medical History:    Diagnosis Date    COPD (chronic obstructive pulmonary disease)     Thyroid disease          Past Surgical History:   Procedure Laterality Date    APPENDECTOMY      SPINE SURGERY         Time Tracking:     OT Date of Treatment: 12/03/22  OT Start Time: 1127  OT Stop Time: 1150  OT Total Time (min): 23 min    Billable Minutes:Evaluation 10  Therapeutic Activity 13    12/3/2022

## 2022-12-03 NOTE — ASSESSMENT & PLAN NOTE
Patient with history of dementia as chart review  Family reports that patient has had increasing moments of confusion recently, however still lucid most of the time  Apparently had previously been on donepezil  Supportive care   Delirium precautions

## 2022-12-03 NOTE — HOSPITAL COURSE
Patient placed in observation for near syncope and PER on IVFs. CT head was negative. EKG showed no ischemia. Serial troponin 0.42>0.44>0.036. No chest pain/SOB. No orthostasis. CPK 2714 and pt had IVVD. Near syncope likely 2/2 dehydration. PER now resolved after IVFs. Pt lives alone, however, pt reports he does not feel safe at home alone. PT/OT recommended SNF. Pt/son agreeable to SNF. CM consulted. Patient's mentation remained intact.  Potassium replaced.  Medically clear for SNF placement. Had an episode of sundowning during hospitalization for which Haldol was given. Discharged to [facility] in stable condition.

## 2022-12-03 NOTE — PT/OT/SLP EVAL
Physical Therapy Evaluation    Patient Name:  Yehuda Sharma   MRN:  [id number]    Recommendations:     Discharge Recommendations:  nursing facility, skilled   Discharge Equipment Recommendations: none   Barriers to discharge: Decreased caregiver support    Assessment:     Yehuda Sharma is a 89 y.o. male admitted with a medical diagnosis of Syncope.  He presents with the following impairments/functional limitations:  impaired functional mobility, gait instability, impaired cognition, pain, decreased safety awareness, decreased lower extremity function Will benefit from acute care PT.    Rehab Prognosis: Good; patient would benefit from acute skilled PT services to address these deficits and reach maximum level of function.    Recent Surgery: * No surgery found *      Plan:     During this hospitalization, patient to be seen 3 x/week to address the identified rehab impairments via gait training, therapeutic activities, therapeutic exercises and progress toward the following goals:    Plan of Care Expires:  12/17/22    Subjective     Chief Complaint: Low back and L knee pain   Patient/Family Comments/goals: get assist with living. Decrease falls  Pain/Comfort:  Pain Rating 1: 7/10  Location 1: back    Patients cultural, spiritual, Mandaen conflicts given the current situation:      Living Environment:  Pt lives alone in one story house.  Uses Rollator for mobility. Having frequent falls. Difficulty getting detailed hx due to pt very concerned about phone and wanting  to speak with son  Prior to admission, patients level of function was Needs equipment with decreasing safety.  Equipment used at home: shower chair, rollator.  DME owned (not currently used): none.  Upon discharge, patient will have assistance from facility.    Objective:     Communicated with Molly and nurse Jacquelin prior to session.  Patient found supine with    upon PT entry to room.    General Precautions: Standard,     Orthopedic Precautions:     Braces:    Respiratory Status: Room air    Exams:  RLE ROM: Deficits: limited  RLE Strength: Deficits: limited  LLE ROM: Deficits: limited  LLE Strength: Deficits: limited    Functional Mobility:  Bed Mobility:     Supine to Sit: moderate assistance  Transfers:     Sit to Stand:  moderate assistance with rolling walker  Gait: 3 side steps with RW and mod A      AM-PAC 6 CLICK MOBILITY  Total Score:12       Treatment & Education:  Therapeutic activity for standing WB and standing balance with RW. Pt with severe fear of falling. Unwilling to attempt gait but did to 3 small standing marches    Patient left supine with all lines intact and call button in reach.    GOALS:   Multidisciplinary Problems       Physical Therapy Goals          Problem: Physical Therapy    Goal Priority Disciplines Outcome Goal Variances Interventions   Physical Therapy Goal     PT, PT/OT Ongoing, Progressing     Description: PT eval complete. The following goals will be met in 14 days  1. PT IND with bed mobility  2. PT min A with transfer with RW  3. Pt amb 75 ft with RW and CGA                       History:     Past Medical History:   Diagnosis Date    COPD (chronic obstructive pulmonary disease)     Thyroid disease        Past Surgical History:   Procedure Laterality Date    APPENDECTOMY      SPINE SURGERY         Time Tracking:     PT Received On: 12/03/22  PT Start Time: 0730     PT Stop Time: 0755  PT Total Time (min): 25 min     Billable Minutes: Evaluation 15 and Therapeutic Activity 10      12/03/2022

## 2022-12-03 NOTE — PT/OT/SLP EVAL
Speech Language Pathology Evaluation  Bedside Swallow    Patient Name:  Yehuda Sharma   MRN:  [id number]  Admitting Diagnosis: Syncope    Recommendations:                 General Recommendations:  Follow-up not indicated  Diet recommendations:  Soft & Bite Sized Diet - IDDSI Level 6, Thin liquids - IDDSI Level 0, Thin   Aspiration Precautions: Standard aspiration precautions   General Precautions: Standard, aspiration  Communication strategies:  go to room if call light pushed    History:   Pt is a 89 year old male admitted after having reported falls and syncope episode.  Pt and granddaughter report that pt is at baseline with his cognition and language skills.   Past Medical History:   Diagnosis Date    COPD (chronic obstructive pulmonary disease)     Thyroid disease        Past Surgical History:   Procedure Laterality Date    APPENDECTOMY      SPINE SURGERY         Social History: Patient lives by himself and was independent with all daily living skills. Granddaughter in room reported family lives close by and checks on him throughout the day.     Prior Intubation HX:  none     Modified Barium Swallow: none reported     Chest X-Rays: no pulmonary infiltrates on x-ray on admit     Prior diet: Regular consistency diet; thin liquids.    Occupation/hobbies/homemaking: n/a.    Subjective     Pt sitting up in bed and reporting that he is hungry.  Patient goals: I would like breakfast if I can get one      Pain/Comfort:  Pain Rating 1: 0/10    Respiratory Status: Room air    Objective:     Oral Musculature Evaluation  Oral Musculature: WFL  Dentition: scattered dentition (pt reported that he normally wears partial dentures but just does not have them with him at this time; granddaughter in room reported they will bring them later today)  Mucosal Quality: good  Oral Labial Strength and Mobility: WFL  Lingual Strength and Mobility: WFL  Velar Elevation: WFL  Buccal Strength and Mobility: WFL    Bedside Swallow Eval:    Consistencies Assessed:  Thin liquids : Pt swallowed with no delays or coughing  Puree Pt swallowed with no delays or coughing  Solids Pt swallowed with no delays or coughing      Oral Phase:   WFL    Pharyngeal Phase:   no overt clinical signs/symptoms of aspiration  no overt clinical signs/symptoms of pharyngeal dysphagia    Compensatory Strategies  None    Treatment: Pt followed commands and answered basic yes/no questions.  Pt oriented to person, place, time and situation.  He did repeat a few questions to his granddaughter and she reported that she is at his baseline with cognition.      Assessment:     Yehuda Sharma is a 89 y.o. male who presented with no swallowing difficulties with liquids, pureed or solids.  He presented with WFL cognition, language and speech skills.  No further S.T. recommended at this time.  Basic swallow precautions reviewed.      Goals:   Multidisciplinary Problems       SLP Goals       Not on file                    Plan:     Patient to be seen:    n/a  Plan of Care expires:   n/a  Plan of Care reviewed with:  patient, grandchild(sarbjit)   SLP Follow-Up:  No       Discharge recommendations:    no further S.T. recommended  Barriers to Discharge:  None    Time Tracking:     SLP Treatment Date:   12/03/22  Speech Start Time:  0850  Speech Stop Time:  0916     Speech Total Time (min):  26 min    Billable Minutes: Total Time 26 minutes     12/03/2022

## 2022-12-03 NOTE — H&P
[facility] Medicine  History & Physical    Patient Name: Yehuda Sharma  MRN: [id number]  Patient Class: OP- Observation  Admission Date: 12/2/2022  Attending Physician: Anais Pink MD   Primary Care Provider: [facility] Ariadne Schroeder         Patient information was obtained from past medical records, ER records and family.     Subjective:     Principal Problem:Syncope    Chief Complaint:   Chief Complaint   Patient presents with    Loss of Consciousness     Pt. Reports he as been passing out all day. EMS says pt. Was hypotensive on arrival.         HPI: Yehuda Sharma is a 89 y.o. male with hypothyroidism, anemia, dementia presented to ED on 12/2/2022 with syncopal episode.  Per family patient has had multiple falls over the past week.  Additionally they noted that he has had poor oral intake.  Family reports that patient has episodes of confusion, however still does well most of the time.  He lives by himself, and his son lives very close by and checks on him frequently. Denies any complaints including chest pain, however was difficult to get full ROS as patient is extremely hard of hearing.  CT head showed no acute abnormality.  Chest x-ray obtained in the ED was limited due to lack of inclusion of the entire thorax, however there was no pulmonary infiltrate.  Patient was also noted to have abnormal labs with BUN 48, creatinine 1.8, , CPK 2714, troponin 0.058.  Placed in observation to hospital medicine service for syncope and multiple falls.    PCP:  [facility] Ariadne Schroeder    SDM:  Son, Carlene Sharma    CODE STATUS:  DNR, confirmed by patient's son        Past Medical History:   Diagnosis Date    COPD (chronic obstructive pulmonary disease)     Thyroid disease        Past Surgical History:   Procedure Laterality Date    APPENDECTOMY      SPINE SURGERY         Review of patient's allergies indicates:   Allergen Reactions    Azithromycin Other (See Comments)     not sure of  reaction    Donepezil Other (See Comments)     Leg cramps, sedation       No current facility-administered medications on file prior to encounter.     Current Outpatient Medications on File Prior to Encounter   Medication Sig    CYANOCOBALAMIN, VITAMIN B-12, INJ Inject 1,000 mcg into the muscle every 30 days.    ergocalciferol (ERGOCALCIFEROL) 50,000 unit Cap Take 50,000 Units by mouth every 7 days.    levothyroxine (SYNTHROID) 112 MCG tablet Take 112 mcg by mouth before breakfast.    magnesium oxide (MAG-OX) 400 mg (241.3 mg magnesium) tablet Take 400 mg by mouth once daily.     Family History       Problem Relation (Age of Onset)    Emphysema Father    Heart failure Mother          Tobacco Use    Smoking status: Former     Types: Cigarettes     Quit date:      Years since quittin.9    Smokeless tobacco: Never   Substance and Sexual Activity    Alcohol use: Not Currently    Drug use: Never    Sexual activity: Not Currently     Review of Systems   Unable to perform ROS: Other  [employer]  Objective:     Vital Signs (Most Recent):  Temp: 97.9 °F (36.6 °C) (22 1428)  Pulse: 100 (22)  Resp: 18 (22)  BP: (!) 125/91 (22)  SpO2: 97 % (22)   Vital Signs (24h Range):  Temp:  [97.9 °F (36.6 °C)] 97.9 °F (36.6 °C)  Pulse:  [] 100  Resp:  [17-20] 18  SpO2:  [92 %-97 %] 97 %  BP: (114-141)/(71-91) 125/91     Weight: 63.5 kg (140 lb)  Body mass index is 21.29 kg/m².    Physical Exam  Vitals and nursing note reviewed. Exam conducted with a chaperone present.   Constitutional:       General: He is not in acute distress.  HENT:      Head: Normocephalic and atraumatic.      Right Ear: External ear normal.      Left Ear: External ear normal.      Nose: Nose normal.      Mouth/Throat:      Mouth: Mucous membranes are dry.   Eyes:      Extraocular Movements: Extraocular movements intact.      Pupils: Pupils are equal, round, and reactive to light.   Cardiovascular:       Rate and Rhythm: Normal rate and regular rhythm.   Pulmonary:      Effort: Pulmonary effort is normal. No respiratory distress.      Breath sounds: No wheezing.      Comments: On room air  Abdominal:      General: Bowel sounds are normal.      Palpations: Abdomen is soft.      Tenderness: There is no abdominal tenderness. There is no guarding or rebound.   Musculoskeletal:      Cervical back: Neck supple.      Right lower leg: No edema.      Left lower leg: No edema.      Comments: Venous stasis changes on bilateral lower extremities   Skin:     General: Skin is warm and dry.      Comments: Abrasion on right elbow, with scab.  Multiple abrasions on lower extremities   Neurological:      Mental Status: He is alert. Mental status is at baseline.      Comments: Occasionally goes on tangent well conversing   Psychiatric:         Mood and Affect: Mood normal.         CRANIAL NERVES     CN III, IV, VI   Pupils are equal, round, and reactive to light.     Significant Labs: All pertinent labs within the past 24 hours have been reviewed.  CBC:   Recent Labs   Lab 12/02/22  1510   WBC 8.70   HGB 10.2*   HCT 31.3*        CMP:   Recent Labs   Lab 12/02/22  1510      K 3.7      CO2 16*   *   BUN 48*   CREATININE 1.8*   CALCIUM 8.7   PROT 6.4   ALBUMIN 3.1*   BILITOT 1.0   ALKPHOS 62   AST 82*   ALT 16   ANIONGAP 13     Troponin:   Recent Labs   Lab 12/02/22  1510   TROPONINI 0.058*       Significant Imaging: I have reviewed all pertinent imaging results/findings within the past 24 hours.    Assessment/Plan:     * Syncope  Unwitnessed, unclear etiology  Echocardiogram pending   Orthostatics vitals  Check UA  PT/OT    Elevated troponin  Mild elevation of troponin  Denies any chest pain  Telemetry monitoring  EKG as needed  Trend troponin  Likely troponin leak secondary to PER      Alzheimer's type dementia with late onset without behavioral disturbance  Patient with history of dementia as chart  review  Family reports that patient has had increasing moments of confusion recently, however still lucid most of the time  Apparently had previously been on donepezil  Supportive care   Delirium precautions  PT/OT/SLP    Anemia due to vitamin B12 deficiency  Patient presented with hemoglobin of 10.2  On cyanocobalamin injection nightly  Monitor CBC      Elevated CPK  Likely secondary to falls in setting of hypovolemia  Gentle IV fluid  Trend CPK, CMP      PER (acute kidney injury)  Patient with acute kidney injury likely due to IVVD/dehydration PER is currently stable. Labs reviewed- Renal function/electrolytes with Estimated Creatinine Clearance: 20.6 mL/min (A) (based on SCr of 1.8 mg/dL (H)). according to latest data.  Additionally CPK noted to be elevated at 2714.  Monitor urine output and serial BMP and adjust therapy as needed. Avoid nephrotoxins and renally dose meds for GFR listed above.   Gentle IV fluid  Strict Is&Os      Hypothyroidism  Chronic   Continue home dose levothyroxine        VTE Risk Mitigation (From admission, onward)    None             Anais Pink MD  Department of Hospital Medicine   [facility] - Telemetry ([facility])

## 2022-12-03 NOTE — ASSESSMENT & PLAN NOTE
Likely secondary to falls in setting of hypovolemia  Gentle IV fluid  Trend CPK, CMP    Follow CPK

## 2022-12-03 NOTE — HPI
Yehuda Sharma is a 89 y.o. male with hypothyroidism, anemia, dementia presented to ED on 12/2/2022 with syncopal episode.  Per family patient has had multiple falls over the past week.  Additionally they noted that he has had poor oral intake.  Family reports that patient has episodes of confusion, however still does well most of the time.  He lives by himself, and his son lives very close by and checks on him frequently. Denies any complaints including chest pain, however was difficult to get full ROS as patient is extremely hard of hearing.  CT head showed no acute abnormality.  Chest x-ray obtained in the ED was limited due to lack of inclusion of the entire thorax, however there was no pulmonary infiltrate.  Patient was also noted to have abnormal labs with BUN 48, creatinine 1.8, , CPK 2714, troponin 0.058.  Placed in observation to hospital medicine service for syncope and multiple falls.    PCP:  [facility]    SDM:  SonCarlene    CODE STATUS:  DNR, confirmed by patient's son

## 2022-12-03 NOTE — PROGRESS NOTES
[facility]  Progress Note    Patient Name: Yehuda Sharma  MRN: [id number]  Patient Class: OP- Observation   Admission Date: 12/2/2022  Length of Stay: 0 days  Attending Physician: Dave Hills MD  Primary Care Provider: No primary care provider on file.        Subjective:     Principal Problem:Syncope        HPI:  Yehuda Sharma is a 89 y.o. male with hypothyroidism, anemia, dementia presented to ED on 12/2/2022 with syncopal episode.  Per family patient has had multiple falls over the past week.  Additionally they noted that he has had poor oral intake.  Family reports that patient has episodes of confusion, however still does well most of the time.  He lives by himself, and his son lives very close by and checks on him frequently. Denies any complaints including chest pain, however was difficult to get full ROS as patient is extremely hard of hearing.  CT head showed no acute abnormality.  Chest x-ray obtained in the ED was limited due to lack of inclusion of the entire thorax, however there was no pulmonary infiltrate.  Patient was also noted to have abnormal labs with BUN 48, creatinine 1.8, , CPK 2714, troponin 0.058.  Placed in observation to hospital medicine service for syncope and multiple falls.    [facility]P:  [facility]    SDM:  SonCarlene    CODE STATUS:  DNR, confirmed by patient's son        Overview/Hospital Course:  Patient admitted for syncope and PER. CT head was negative. Echo results pending. Serial troponin's tending down. Renal function improving with IV hydration. Patient c/o left knee pain, will get a xray.           Review of Systems   Constitutional: Negative.    HENT: Negative.     Eyes: Negative.    Respiratory: Negative.     Cardiovascular: Negative.    Gastrointestinal: Negative.    Endocrine: Negative.    Genitourinary:  Positive for flank pain (right side).   Musculoskeletal:         + left knee pain    Skin: Negative.     Allergic/Immunologic: Negative.    Neurological:  Positive for dizziness and weakness.   Hematological: Negative.    Psychiatric/Behavioral: Negative.     Objective:     Vital Signs (Most Recent):  Temp: 98.1 °F (36.7 °C) (12/03/22 1127)  Pulse: 62 (12/03/22 1127)  Resp: 18 (12/03/22 1127)  BP: 126/88 (12/03/22 1127)  SpO2: (!) 94 % (12/03/22 1127)   Vital Signs (24h Range):  Temp:  [97.5 °F (36.4 °C)-98.1 °F (36.7 °C)] 98.1 °F (36.7 °C)  Pulse:  [] 62  Resp:  [17-20] 18  SpO2:  [92 %-97 %] 94 %  BP: (114-141)/(71-91) 126/88     Weight: 69.4 kg (153 lb)  Body mass index is 23.26 kg/m².    Intake/Output Summary (Last 24 hours) at 12/3/2022 1326  Last data filed at 12/2/2022 1713  Gross per 24 hour   Intake 999 ml   Output --   Net 999 ml      Physical Exam  Vitals and nursing note reviewed. Exam conducted with a chaperone present.   Constitutional:       General: He is not in acute distress.  HENT:      Head: Normocephalic and atraumatic.   Eyes:      Extraocular Movements: Extraocular movements intact.      Pupils: Pupils are equal, round, and reactive to light.   Cardiovascular:      Rate and Rhythm: Normal rate and regular rhythm.   Pulmonary:      Effort: Pulmonary effort is normal. No respiratory distress.      Breath sounds: No wheezing.   Abdominal:      General: Bowel sounds are normal.      Palpations: Abdomen is soft.      Tenderness: There is no abdominal tenderness. There is no guarding or rebound.   Musculoskeletal:      Cervical back: Neck supple.      Right lower leg: No edema.      Left lower leg: No edema.      Comments: Venous stasis changes on bilateral lower extremities   Skin:     General: Skin is warm and dry.      Comments: Abrasion on right elbow, with scab.  Multiple abrasions on lower extremities   Neurological:      Mental Status: He is alert. Mental status is at baseline.   Psychiatric:         Mood and Affect: Mood normal.       Significant Labs: All pertinent labs within the past  24 hours have been reviewed.  BMP:   Recent Labs   Lab 12/03/22  0543   GLU 96      K 3.8      CO2 20*   BUN 43*   CREATININE 1.6*   CALCIUM 8.5*     CBC:   Recent Labs   Lab 12/02/22  1510 12/03/22  0543   WBC 8.70 6.71   HGB 10.2* 9.4*   HCT 31.3* 28.4*    189     CMP:   Recent Labs   Lab 12/02/22  1510 12/03/22  0543    139   K 3.7 3.8    107   CO2 16* 20*   * 96   BUN 48* 43*   CREATININE 1.8* 1.6*   CALCIUM 8.7 8.5*   PROT 6.4 6.0   ALBUMIN 3.1* 3.0*   BILITOT 1.0 0.9   ALKPHOS 62 63   AST 82* 62*   ALT 16 17   ANIONGAP 13 12     Troponin:   Recent Labs   Lab 12/02/22 1952 12/02/22 2212 12/03/22  0543   TROPONINI 0.042* 0.044* 0.036*     Urine Studies: No results for input(s): COLORU, APPEARANCEUA, PHUR, SPECGRAV, PROTEINUA, GLUCUA, KETONESU, BILIRUBINUA, OCCULTUA, NITRITE, UROBILINOGEN, LEUKOCYTESUR, RBCUA, WBCUA, BACTERIA, SQUAMEPITHEL, HYALINECASTS in the last 48 hours.    Invalid input(s): WRIGHTSUR    Significant Imaging: I have reviewed all pertinent imaging results/findings within the past 24 hours.      Assessment/Plan:      * Syncope  Unwitnessed, unclear etiology  -Orthostatic VS every shift  -Neuro checks every 4 hours  -Serial troponin levels trending down  - ECHO pending        Elevated troponin  Likely troponin leak secondary to PER  Serial troponin's trending down  Denies any chest pain or SOB       Alzheimer's type dementia with late onset without behavioral disturbance  Patient with history of dementia as chart review  Family reports that patient has had increasing moments of confusion recently, however still lucid most of the time  Apparently had previously been on donepezil  Supportive care   Delirium precautions      Anemia due to vitamin B12 deficiency  On cyanocobalamin injection nightly  Follow CBC, transfuse as needed       Elevated CPK  Likely secondary to falls in setting of hypovolemia  Gentle IV fluid  Trend CPK, CMP    Follow CPK       PER  (acute kidney injury)  Patient with acute kidney injury likely due to IVVD/dehydration PER is currently stable. Labs reviewed- Renal function/electrolytes with Estimated Creatinine Clearance: 24.9 mL/min (A) (based on SCr of 1.6 mg/dL (H)). according to latest data.  Additionally CPK noted to be elevated at 2714.  Monitor urine output and serial BMP and adjust therapy as needed. Avoid nephrotoxins and renally dose meds for GFR listed above.   Gentle IV fluid  Strict Is&Os    Renal function improving down to 1.6   Continue IV hydration   Follow BMP     Hypothyroidism  Chronic   Continue home dose levothyroxine        VTE Risk Mitigation (From admission, onward)    None          Discharge Planning   KRYSTAL:      Code Status: DNR   Is the patient medically ready for discharge?:     Reason for patient still in hospital (select all that apply): Patient trending condition, Laboratory test, Treatment, Imaging and PT / OT recommendations                     Danisha Rees NP  Department of Hospital Medicine   [facility] - Telemetry ([facility])

## 2022-12-03 NOTE — ED NOTES
Patient remains AAOX4, denies any complaints at this time. Family at bedside. Side rails up x 2, CB in reach, bed in low position.

## 2022-12-03 NOTE — ASSESSMENT & PLAN NOTE
Patient with acute kidney injury likely due to IVVD/dehydration PER is currently stable. Labs reviewed- Renal function/electrolytes with Estimated Creatinine Clearance: 24.9 mL/min (A) (based on SCr of 1.6 mg/dL (H)). according to latest data.  Additionally CPK noted to be elevated at 2714.  Monitor urine output and serial BMP and adjust therapy as needed. Avoid nephrotoxins and renally dose meds for GFR listed above.   Gentle IV fluid  Strict Is&Os    Renal function improving down to 1.6   Continue IV hydration   Follow BMP

## 2022-12-03 NOTE — ASSESSMENT & PLAN NOTE
Likely troponin leak secondary to PER  Serial troponin's trending down  Denies any chest pain or SOB

## 2022-12-03 NOTE — PHARMACY MED REC
"Admission Medication History     The home medication history was taken by Gino Salinas.    You may go to "Admission" then "Reconcile Home Medications" tabs to review and/or act upon these items.     The home medication list has been updated by the Pharmacy department.   Please read ALL comments highlighted in yellow.   Please address this information as you see fit.    Feel free to contact us if you have any questions or require assistance.      Son unsure of patient's last dose of medications; has two bottles of levothyroxine-- 88 mcg and 112 mcg, went with the most recent prescription--not sure which one patient has been taking or if he may be taking both not realizing.    Medications listed below were obtained from: Medications brought from home  (Not in a hospital admission)      Potential issues to be addressed PRIOR TO DISCHARGE: NONE    Gino Salinas CPhT-Adv  Pharmacy Technician Specialist-Medication History  [facility] [phone]  Secure chat preferred     Current Outpatient Medications on File Prior to Encounter   Medication Sig Dispense Refill Last Dose    CYANOCOBALAMIN, VITAMIN B-12, INJ Inject 1,000 mcg into the muscle every 30 days.   Unknown    ergocalciferol (ERGOCALCIFEROL) 50,000 unit Cap Take 50,000 Units by mouth every 7 days.   Unknown    levothyroxine (SYNTHROID) 112 MCG tablet Take 112 mcg by mouth before breakfast.   Unknown    magnesium oxide (MAG-OX) 400 mg (241.3 mg magnesium) tablet Take 400 mg by mouth once daily.   Unknown                           .        "

## 2022-12-03 NOTE — ASSESSMENT & PLAN NOTE
Mild elevation of troponin  Denies any chest pain  Telemetry monitoring  EKG as needed  Trend troponin  Likely troponin leak secondary to PER

## 2022-12-04 LAB
ALBUMIN SERPL BCP-MCNC: 2.8 G/DL (ref 3.5–5.2)
ALP SERPL-CCNC: 63 U/L (ref 55–135)
ALT SERPL W/O P-5'-P-CCNC: 15 U/L (ref 10–44)
ANION GAP SERPL CALC-SCNC: 11 MMOL/L (ref 8–16)
AST SERPL-CCNC: 35 U/L (ref 10–40)
BASOPHILS # BLD AUTO: 0 K/UL (ref 0–0.2)
BASOPHILS NFR BLD: 0 % (ref 0–1.9)
BILIRUB SERPL-MCNC: 0.7 MG/DL (ref 0.1–1)
BUN SERPL-MCNC: 29 MG/DL (ref 10–30)
CALCIUM SERPL-MCNC: 8.3 MG/DL (ref 8.7–10.5)
CHLORIDE SERPL-SCNC: 108 MMOL/L (ref 95–110)
CO2 SERPL-SCNC: 20 MMOL/L (ref 23–29)
CREAT SERPL-MCNC: 1.1 MG/DL (ref 0.5–1.4)
DIFFERENTIAL METHOD: ABNORMAL
EOSINOPHIL # BLD AUTO: 0 K/UL (ref 0–0.5)
EOSINOPHIL NFR BLD: 0.3 % (ref 0–8)
ERYTHROCYTE [DISTWIDTH] IN BLOOD BY AUTOMATED COUNT: 14.8 % (ref 11.5–14.5)
EST. GFR  (NO RACE VARIABLE): >60 ML/MIN/1.73 M^2
GLUCOSE SERPL-MCNC: 102 MG/DL (ref 70–110)
HCT VFR BLD AUTO: 29.8 % (ref 40–54)
HGB BLD-MCNC: 9.7 G/DL (ref 14–18)
IMM GRANULOCYTES # BLD AUTO: 0.02 K/UL (ref 0–0.04)
IMM GRANULOCYTES NFR BLD AUTO: 0.3 % (ref 0–0.5)
LYMPHOCYTES # BLD AUTO: 0.4 K/UL (ref 1–4.8)
LYMPHOCYTES NFR BLD: 6.6 % (ref 18–48)
MCH RBC QN AUTO: 30.5 PG (ref 27–31)
MCHC RBC AUTO-ENTMCNC: 32.6 G/DL (ref 32–36)
MCV RBC AUTO: 94 FL (ref 82–98)
MONOCYTES # BLD AUTO: 0.5 K/UL (ref 0.3–1)
MONOCYTES NFR BLD: 7.6 % (ref 4–15)
NEUTROPHILS # BLD AUTO: 5.2 K/UL (ref 1.8–7.7)
NEUTROPHILS NFR BLD: 85.2 % (ref 38–73)
NRBC BLD-RTO: 0 /100 WBC
PLATELET # BLD AUTO: 174 K/UL (ref 150–450)
PMV BLD AUTO: 10.4 FL (ref 9.2–12.9)
POTASSIUM SERPL-SCNC: 3.3 MMOL/L (ref 3.5–5.1)
PROT SERPL-MCNC: 5.8 G/DL (ref 6–8.4)
RBC # BLD AUTO: 3.18 M/UL (ref 4.6–6.2)
SODIUM SERPL-SCNC: 139 MMOL/L (ref 136–145)
WBC # BLD AUTO: 6.06 K/UL (ref 3.9–12.7)

## 2022-12-04 PROCEDURE — 96361 HYDRATE IV INFUSION ADD-ON: CPT

## 2022-12-04 PROCEDURE — 25000003 PHARM REV CODE 250: Performed by: INTERNAL MEDICINE

## 2022-12-04 PROCEDURE — 63600175 PHARM REV CODE 636 W HCPCS: Performed by: INTERNAL MEDICINE

## 2022-12-04 PROCEDURE — 25000003 PHARM REV CODE 250: Performed by: NURSE PRACTITIONER

## 2022-12-04 PROCEDURE — 36415 COLL VENOUS BLD VENIPUNCTURE: CPT | Performed by: INTERNAL MEDICINE

## 2022-12-04 PROCEDURE — 93005 ELECTROCARDIOGRAM TRACING: CPT

## 2022-12-04 PROCEDURE — G0378 HOSPITAL OBSERVATION PER HR: HCPCS

## 2022-12-04 PROCEDURE — 80053 COMPREHEN METABOLIC PANEL: CPT | Performed by: INTERNAL MEDICINE

## 2022-12-04 PROCEDURE — 85025 COMPLETE CBC W/AUTO DIFF WBC: CPT | Performed by: INTERNAL MEDICINE

## 2022-12-04 PROCEDURE — 93010 EKG 12-LEAD: ICD-10-PCS | Mod: ,,, | Performed by: STUDENT IN AN ORGANIZED HEALTH CARE EDUCATION/TRAINING PROGRAM

## 2022-12-04 PROCEDURE — 93010 ELECTROCARDIOGRAM REPORT: CPT | Mod: ,,, | Performed by: STUDENT IN AN ORGANIZED HEALTH CARE EDUCATION/TRAINING PROGRAM

## 2022-12-04 RX ORDER — POTASSIUM CHLORIDE 20 MEQ/1
40 TABLET, EXTENDED RELEASE ORAL ONCE
Status: COMPLETED | OUTPATIENT
Start: 2022-12-04 | End: 2022-12-04

## 2022-12-04 RX ADMIN — SODIUM CHLORIDE, POTASSIUM CHLORIDE, SODIUM LACTATE AND CALCIUM CHLORIDE: 600; 310; 30; 20 INJECTION, SOLUTION INTRAVENOUS at 12:12

## 2022-12-04 RX ADMIN — Medication 400 MG: at 07:12

## 2022-12-04 RX ADMIN — LEVOTHYROXINE SODIUM 112 MCG: 0.11 TABLET ORAL at 05:12

## 2022-12-04 RX ADMIN — POTASSIUM CHLORIDE 40 MEQ: 1500 TABLET, EXTENDED RELEASE ORAL at 09:12

## 2022-12-04 NOTE — PLAN OF CARE
Pt oriented to self VSS  Pt remained afebile throughout this shift  All meds administered per order.  Pt remianed free of falls  Plan of care reviewed. pt verbalizes understanding.  Patient moving/ turning with assist.  Patient afib on monitor  Bed low, side rails up x2, wheels locked, call light in reach.  Pt instructed to call for assistance

## 2022-12-04 NOTE — ASSESSMENT & PLAN NOTE
Patient placed in observation for near syncope and PER on IVFs. CT head was negative. EKG showed no ischemia. Serial troponin 0.42>0.44>0.036. No chest pain/SOB. No orthostasis. CPK 2714 and pt had IVVD. Near syncope likely 2/2 dehydration. PER now resolved after IVFs. Pt lives alone, however, pt reports he does not feel safe at home alone. PT/OT recommended SNF. Pt/son agreeable to SNF. CM consulted.

## 2022-12-04 NOTE — ASSESSMENT & PLAN NOTE
Likely secondary to falls in setting of hypovolemia  Gentle IV fluid  Trend CPK, CMP    improved

## 2022-12-04 NOTE — ASSESSMENT & PLAN NOTE
Patient with acute kidney injury likely due to IVVD/dehydration PER is currently stable. Labs reviewed- Renal function/electrolytes with Estimated Creatinine Clearance: 36.3 mL/min (based on SCr of 1.1 mg/dL). according to latest data.  Additionally CPK noted to be elevated at 2714.  Monitor urine output and serial BMP and adjust therapy as needed. Avoid nephrotoxins and renally dose meds for GFR listed above.   Gentle IV fluid  Strict Is&Os    PER resolved with IV hydration    Follow BMP

## 2022-12-04 NOTE — SUBJECTIVE & OBJECTIVE
Interval History: Dizziness/near syncopal symptoms resolved after IV hydration. Pt medically cleared for discharge. Pt/family requests SNF placement    Review of Systems   Constitutional:  Positive for activity change and fatigue. Negative for appetite change, chills, diaphoresis and fever.   HENT:  Negative for congestion, nosebleeds, sore throat and trouble swallowing.    Eyes:  Negative for pain, discharge and visual disturbance.   Respiratory:  Negative for apnea, cough, chest tightness, shortness of breath, wheezing and stridor.    Cardiovascular:  Negative for chest pain, palpitations and leg swelling.   Gastrointestinal:  Negative for abdominal distention, abdominal pain, blood in stool, constipation, diarrhea, nausea and vomiting.   Endocrine: Negative for cold intolerance and heat intolerance.   Genitourinary:  Negative for difficulty urinating, dysuria, flank pain, frequency and urgency.   Musculoskeletal:  Negative for arthralgias, back pain, joint swelling, myalgias, neck pain and neck stiffness.   Skin:  Negative for rash and wound.   Allergic/Immunologic: Negative for food allergies and immunocompromised state.   Neurological:  Positive for syncope and weakness. Negative for dizziness, seizures, facial asymmetry, light-headedness and headaches.   Hematological:  Negative for adenopathy.   Psychiatric/Behavioral:  Negative for agitation, behavioral problems and confusion. The patient is not nervous/anxious.    Objective:     Vital Signs (Most Recent):  Temp: 98.2 °F (36.8 °C) (12/04/22 1202)  Pulse: 92 (12/04/22 1305)  Resp: 18 (12/04/22 1202)  BP: 129/72 (12/04/22 1202)  SpO2: (!) 91 % (12/04/22 1202)   Vital Signs (24h Range):  Temp:  [97.2 °F (36.2 °C)-98.2 °F (36.8 °C)] 98.2 °F (36.8 °C)  Pulse:  [73-92] 92  Resp:  [16-18] 18  SpO2:  [90 %-93 %] 91 %  BP: (122-179)/(69-84) 129/72     Weight: 69.4 kg (153 lb)  Body mass index is 23.26 kg/m².    Intake/Output Summary (Last 24 hours) at 12/4/2022  1438  Last data filed at 12/4/2022 0523  Gross per 24 hour   Intake 1814.93 ml   Output 1050 ml   Net 764.93 ml      Physical Exam  Vitals and nursing note reviewed.   Constitutional:       General: He is not in acute distress.     Appearance: He is normal weight. He is not diaphoretic.   HENT:      Head: Normocephalic and atraumatic.      Nose: Nose normal.   Eyes:      General: No scleral icterus.     Conjunctiva/sclera: Conjunctivae normal.   Cardiovascular:      Rate and Rhythm: Normal rate and regular rhythm.      Heart sounds: Normal heart sounds. No murmur heard.    No friction rub. No gallop.   Pulmonary:      Effort: Pulmonary effort is normal. No respiratory distress.      Breath sounds: Normal breath sounds. No stridor. No wheezing or rales.   Chest:      Chest wall: No tenderness.   Abdominal:      General: Bowel sounds are normal. There is no distension.      Palpations: Abdomen is soft.      Tenderness: There is no abdominal tenderness. There is no guarding or rebound.   Musculoskeletal:         General: No tenderness or deformity. Normal range of motion.      Cervical back: Normal range of motion and neck supple.   Skin:     General: Skin is warm and dry.      Coloration: Skin is not pale.      Findings: No erythema or rash.   Neurological:      Mental Status: He is alert and oriented to person, place, and time.      Cranial Nerves: No cranial nerve deficit.      Motor: No abnormal muscle tone.      Coordination: Coordination normal.   Psychiatric:         Behavior: Behavior normal.         Thought Content: Thought content normal.         Cognition and Memory: Cognition is impaired (very mild).       Significant Labs: All pertinent labs within the past 24 hours have been reviewed.    Significant Imaging: I have reviewed all pertinent imaging results/findings within the past 24 hours.

## 2022-12-04 NOTE — ASSESSMENT & PLAN NOTE
Likely troponin leak secondary to PER and elevated CPK  Serial troponin's trended down-No ACS  Denies any chest pain or SOB

## 2022-12-04 NOTE — PROGRESS NOTES
[facility] Medicine  Progress Note    Patient Name: Yehuda Sharma  MRN: [id number]  Patient Class: OP- Observation   Admission Date: 12/2/2022  Length of Stay: 0 days  Attending Physician: Dave Hills MD  Primary Care Provider: No primary care provider on file.        Subjective:     Principal Problem:Syncope        HPI:  Yehuda Sharma is a 89 y.o. male with hypothyroidism, anemia, dementia presented to ED on 12/2/2022 with syncopal episode.  Per family patient has had multiple falls over the past week.  Additionally they noted that he has had poor oral intake.  Family reports that patient has episodes of confusion, however still does well most of the time.  He lives by himself, and his son lives very close by and checks on him frequently. Denies any complaints including chest pain, however was difficult to get full ROS as patient is extremely hard of hearing.  CT head showed no acute abnormality.  Chest x-ray obtained in the ED was limited due to lack of inclusion of the entire thorax, however there was no pulmonary infiltrate.  Patient was also noted to have abnormal labs with BUN 48, creatinine 1.8, , CPK 2714, troponin 0.058.  Placed in observation to hospital medicine service for syncope and multiple falls.    PCP:  [facility]    SDM:  SonCarlene    CODE STATUS:  DNR, confirmed by patient's son        Overview/Hospital Course:  Patient placed in observation for near syncope and PER on IVFs. CT head was negative. EKG showed no ischemia. Serial troponin 0.42>0.44>0.036. No chest pain/SOB. No orthostasis. CPK 2714 and pt had IVVD. Near syncope likely 2/2 dehydration. PER now resolved after IVFs. Pt lives alone, however, pt reports he does not feel safe at home alone. PT/OT recommended SNF. Pt/son agreeable to SNF. CM consulted.       Interval History: Dizziness/near syncopal symptoms resolved after IV hydration. Pt medically cleared for discharge.  Pt/family requests SNF placement    Review of Systems   Constitutional:  Positive for activity change and fatigue. Negative for appetite change, chills, diaphoresis and fever.   HENT:  Negative for congestion, nosebleeds, sore throat and trouble swallowing.    Eyes:  Negative for pain, discharge and visual disturbance.   Respiratory:  Negative for apnea, cough, chest tightness, shortness of breath, wheezing and stridor.    Cardiovascular:  Negative for chest pain, palpitations and leg swelling.   Gastrointestinal:  Negative for abdominal distention, abdominal pain, blood in stool, constipation, diarrhea, nausea and vomiting.   Endocrine: Negative for cold intolerance and heat intolerance.   Genitourinary:  Negative for difficulty urinating, dysuria, flank pain, frequency and urgency.   Musculoskeletal:  Negative for arthralgias, back pain, joint swelling, myalgias, neck pain and neck stiffness.   Skin:  Negative for rash and wound.   Allergic/Immunologic: Negative for food allergies and immunocompromised state.   Neurological:  Positive for syncope and weakness. Negative for dizziness, seizures, facial asymmetry, light-headedness and headaches.   Hematological:  Negative for adenopathy.   Psychiatric/Behavioral:  Negative for agitation, behavioral problems and confusion. The patient is not nervous/anxious.    Objective:     Vital Signs (Most Recent):  Temp: 98.2 °F (36.8 °C) (12/04/22 1202)  Pulse: 92 (12/04/22 1305)  Resp: 18 (12/04/22 1202)  BP: 129/72 (12/04/22 1202)  SpO2: (!) 91 % (12/04/22 1202)   Vital Signs (24h Range):  Temp:  [97.2 °F (36.2 °C)-98.2 °F (36.8 °C)] 98.2 °F (36.8 °C)  Pulse:  [73-92] 92  Resp:  [16-18] 18  SpO2:  [90 %-93 %] 91 %  BP: (122-179)/(69-84) 129/72     Weight: 69.4 kg (153 lb)  Body mass index is 23.26 kg/m².    Intake/Output Summary (Last 24 hours) at 12/4/2022 1438  Last data filed at 12/4/2022 0523  Gross per 24 hour   Intake 1814.93 ml   Output 1050 ml   Net 764.93 ml       Physical Exam  Vitals and nursing note reviewed.   Constitutional:       General: He is not in acute distress.     Appearance: He is normal weight. He is not diaphoretic.   HENT:      Head: Normocephalic and atraumatic.      Nose: Nose normal.   Eyes:      General: No scleral icterus.     Conjunctiva/sclera: Conjunctivae normal.   Cardiovascular:      Rate and Rhythm: Normal rate and regular rhythm.      Heart sounds: Normal heart sounds. No murmur heard.    No friction rub. No gallop.   Pulmonary:      Effort: Pulmonary effort is normal. No respiratory distress.      Breath sounds: Normal breath sounds. No stridor. No wheezing or rales.   Chest:      Chest wall: No tenderness.   Abdominal:      General: Bowel sounds are normal. There is no distension.      Palpations: Abdomen is soft.      Tenderness: There is no abdominal tenderness. There is no guarding or rebound.   Musculoskeletal:         General: No tenderness or deformity. Normal range of motion.      Cervical back: Normal range of motion and neck supple.   Skin:     General: Skin is warm and dry.      Coloration: Skin is not pale.      Findings: No erythema or rash.   Neurological:      Mental Status: He is alert and oriented to person, place, and time.      Cranial Nerves: No cranial nerve deficit.      Motor: No abnormal muscle tone.      Coordination: Coordination normal.   Psychiatric:         Behavior: Behavior normal.         Thought Content: Thought content normal.         Cognition and Memory: Cognition is impaired (very mild).       Significant Labs: All pertinent labs within the past 24 hours have been reviewed.    Significant Imaging: I have reviewed all pertinent imaging results/findings within the past 24 hours.      Assessment/Plan:      * Syncope  Patient placed in observation for near syncope and PER on IVFs. CT head was negative. EKG showed no ischemia. Serial troponin 0.42>0.44>0.036. No chest pain/SOB. No orthostasis. CPK 2714 and pt  had IVVD. Near syncope likely 2/2 dehydration. PER now resolved after IVFs. Pt lives alone, however, pt reports he does not feel safe at home alone. PT/OT recommended SNF. Pt/son agreeable to SNF. CM consulted.     PER (acute kidney injury)  Patient with acute kidney injury likely due to IVVD/dehydration PER is currently stable. Labs reviewed- Renal function/electrolytes with Estimated Creatinine Clearance: 36.3 mL/min (based on SCr of 1.1 mg/dL). according to latest data.  Additionally CPK noted to be elevated at 2714.  Monitor urine output and serial BMP and adjust therapy as needed. Avoid nephrotoxins and renally dose meds for GFR listed above.   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  Patient with history of dementia as chart review  Family reports that patient has had increasing moments of confusion recently, however still lucid most of the time  Apparently had previously been on donepezil  Supportive care   Delirium precautions      Anemia due to vitamin B12 deficiency  On cyanocobalamin injection nightly  Follow CBC, transfuse as needed       Hypothyroidism  Chronic   Continue home dose levothyroxine        VTE Risk Mitigation (From admission, onward)    None          Discharge Planning   KRYSTAL:      Code Status: DNR   Is the patient medically ready for discharge?:     Reason for patient still in hospital (select all that apply): Pending disposition                     Lizbeth Guerrier NP  Department of Hospital Medicine   [facility] - Telemetry ([facility])

## 2022-12-04 NOTE — PLAN OF CARE
Problem: Adult Inpatient Plan of Care  Goal: Patient-Specific Goal (Individualized)  Outcome: Ongoing, Progressing     Pt pleasantly confused   Afib on tele monitor - rate controled   Blood pressures stable   Afebrile   Unable to obtain orthos. Pt extremely weak and unable to stand for 0800 and 1600 for me to obtain   LR at 50  PT/OT/ST   Discharge plan is SNF   Fall precautions in place

## 2022-12-05 LAB
ALBUMIN SERPL BCP-MCNC: 2.7 G/DL (ref 3.5–5.2)
ALP SERPL-CCNC: 66 U/L (ref 55–135)
ALT SERPL W/O P-5'-P-CCNC: 14 U/L (ref 10–44)
ANION GAP SERPL CALC-SCNC: 10 MMOL/L (ref 8–16)
AST SERPL-CCNC: 23 U/L (ref 10–40)
BASOPHILS # BLD AUTO: 0.01 K/UL (ref 0–0.2)
BASOPHILS NFR BLD: 0.2 % (ref 0–1.9)
BILIRUB SERPL-MCNC: 0.8 MG/DL (ref 0.1–1)
BUN SERPL-MCNC: 20 MG/DL (ref 10–30)
CALCIUM SERPL-MCNC: 8.1 MG/DL (ref 8.7–10.5)
CHLORIDE SERPL-SCNC: 106 MMOL/L (ref 95–110)
CK SERPL-CCNC: 221 U/L (ref 20–200)
CO2 SERPL-SCNC: 22 MMOL/L (ref 23–29)
CREAT SERPL-MCNC: 1 MG/DL (ref 0.5–1.4)
DIFFERENTIAL METHOD: ABNORMAL
EOSINOPHIL # BLD AUTO: 0.1 K/UL (ref 0–0.5)
EOSINOPHIL NFR BLD: 0.9 % (ref 0–8)
ERYTHROCYTE [DISTWIDTH] IN BLOOD BY AUTOMATED COUNT: 14.9 % (ref 11.5–14.5)
EST. GFR  (NO RACE VARIABLE): >60 ML/MIN/1.73 M^2
GLUCOSE SERPL-MCNC: 101 MG/DL (ref 70–110)
HCT VFR BLD AUTO: 30.1 % (ref 40–54)
HGB BLD-MCNC: 9.9 G/DL (ref 14–18)
IMM GRANULOCYTES # BLD AUTO: 0.02 K/UL (ref 0–0.04)
IMM GRANULOCYTES NFR BLD AUTO: 0.3 % (ref 0–0.5)
LYMPHOCYTES # BLD AUTO: 0.4 K/UL (ref 1–4.8)
LYMPHOCYTES NFR BLD: 7.4 % (ref 18–48)
MAGNESIUM SERPL-MCNC: 1.5 MG/DL (ref 1.6–2.6)
MCH RBC QN AUTO: 30.4 PG (ref 27–31)
MCHC RBC AUTO-ENTMCNC: 32.9 G/DL (ref 32–36)
MCV RBC AUTO: 92 FL (ref 82–98)
MONOCYTES # BLD AUTO: 0.6 K/UL (ref 0.3–1)
MONOCYTES NFR BLD: 9.5 % (ref 4–15)
NEUTROPHILS # BLD AUTO: 4.7 K/UL (ref 1.8–7.7)
NEUTROPHILS NFR BLD: 81.7 % (ref 38–73)
NRBC BLD-RTO: 0 /100 WBC
PLATELET # BLD AUTO: 169 K/UL (ref 150–450)
PMV BLD AUTO: 10.8 FL (ref 9.2–12.9)
POTASSIUM SERPL-SCNC: 3.3 MMOL/L (ref 3.5–5.1)
PROT SERPL-MCNC: 5.8 G/DL (ref 6–8.4)
RBC # BLD AUTO: 3.26 M/UL (ref 4.6–6.2)
SODIUM SERPL-SCNC: 138 MMOL/L (ref 136–145)
WBC # BLD AUTO: 5.79 K/UL (ref 3.9–12.7)

## 2022-12-05 PROCEDURE — 25000003 PHARM REV CODE 250: Performed by: NURSE PRACTITIONER

## 2022-12-05 PROCEDURE — 96361 HYDRATE IV INFUSION ADD-ON: CPT

## 2022-12-05 PROCEDURE — 36415 COLL VENOUS BLD VENIPUNCTURE: CPT | Performed by: NURSE PRACTITIONER

## 2022-12-05 PROCEDURE — 83735 ASSAY OF MAGNESIUM: CPT | Performed by: NURSE PRACTITIONER

## 2022-12-05 PROCEDURE — 82550 ASSAY OF CK (CPK): CPT | Performed by: NURSE PRACTITIONER

## 2022-12-05 PROCEDURE — G0378 HOSPITAL OBSERVATION PER HR: HCPCS

## 2022-12-05 PROCEDURE — 80053 COMPREHEN METABOLIC PANEL: CPT | Performed by: NURSE PRACTITIONER

## 2022-12-05 PROCEDURE — 63600175 PHARM REV CODE 636 W HCPCS: Performed by: INTERNAL MEDICINE

## 2022-12-05 PROCEDURE — 85025 COMPLETE CBC W/AUTO DIFF WBC: CPT | Performed by: NURSE PRACTITIONER

## 2022-12-05 PROCEDURE — 25000003 PHARM REV CODE 250: Performed by: INTERNAL MEDICINE

## 2022-12-05 RX ORDER — POTASSIUM CHLORIDE 20 MEQ/1
40 TABLET, EXTENDED RELEASE ORAL ONCE
Status: COMPLETED | OUTPATIENT
Start: 2022-12-05 | End: 2022-12-05

## 2022-12-05 RX ADMIN — ACETAMINOPHEN 650 MG: 325 TABLET ORAL at 08:12

## 2022-12-05 RX ADMIN — SODIUM CHLORIDE, POTASSIUM CHLORIDE, SODIUM LACTATE AND CALCIUM CHLORIDE: 600; 310; 30; 20 INJECTION, SOLUTION INTRAVENOUS at 08:12

## 2022-12-05 RX ADMIN — LEVOTHYROXINE SODIUM 112 MCG: 0.11 TABLET ORAL at 05:12

## 2022-12-05 RX ADMIN — Medication 400 MG: at 08:12

## 2022-12-05 RX ADMIN — POTASSIUM CHLORIDE 40 MEQ: 1500 TABLET, EXTENDED RELEASE ORAL at 10:12

## 2022-12-05 NOTE — ASSESSMENT & PLAN NOTE
Patient with history of dementia as chart review  Family reports that patient has had increasing moments of confusion recently, however still lucid most of the time  Apparently had previously been on donepezil  Supportive care   Delirium precautions    12/5:  Pending SNF placement

## 2022-12-05 NOTE — ASSESSMENT & PLAN NOTE
Likely secondary to falls in setting of hypovolemia  Gentle IV fluid  Trend CPK, CMP    improved    12/5:  CPK 2714>1129>221

## 2022-12-05 NOTE — SUBJECTIVE & OBJECTIVE
Interval History:  Resting comfortably.  Pending SNF placement    Review of Systems   Constitutional:  Positive for activity change and fatigue. Negative for appetite change, chills, diaphoresis and fever.   HENT:  Negative for congestion, nosebleeds, sore throat and trouble swallowing.    Eyes:  Negative for pain, discharge and visual disturbance.   Respiratory:  Negative for apnea, cough, chest tightness, shortness of breath, wheezing and stridor.    Cardiovascular:  Negative for chest pain, palpitations and leg swelling.   Gastrointestinal:  Negative for abdominal distention, abdominal pain, blood in stool, constipation, diarrhea, nausea and vomiting.   Endocrine: Negative for cold intolerance and heat intolerance.   Genitourinary:  Negative for difficulty urinating, dysuria, flank pain, frequency and urgency.   Musculoskeletal:  Negative for arthralgias, back pain, joint swelling, myalgias, neck pain and neck stiffness.   Skin:  Negative for rash and wound.   Allergic/Immunologic: Negative for food allergies and immunocompromised state.   Neurological:  Positive for syncope and weakness. Negative for dizziness, seizures, facial asymmetry, light-headedness and headaches.   Hematological:  Negative for adenopathy.   Psychiatric/Behavioral:  Negative for agitation, behavioral problems and confusion. The patient is not nervous/anxious.    Objective:     Vital Signs (Most Recent):  Temp: 98.1 °F (36.7 °C) (12/05/22 1120)  Pulse: 76 (12/05/22 1305)  Resp: 16 (12/05/22 1120)  BP: 136/83 (12/05/22 1120)  SpO2: (!) 92 % (12/05/22 1120)   Vital Signs (24h Range):  Temp:  [97.8 °F (36.6 °C)-98.6 °F (37 °C)] 98.1 °F (36.7 °C)  Pulse:  [70-90] 76  Resp:  [16-18] 16  SpO2:  [90 %-93 %] 92 %  BP: (118-165)/(68-95) 136/83     Weight: 71.4 kg (157 lb 6.5 oz)  Body mass index is 23.93 kg/m².    Intake/Output Summary (Last 24 hours) at 12/5/2022 1456  Last data filed at 12/5/2022 1405  Gross per 24 hour   Intake 1966.04 ml    Output 500 ml   Net 1466.04 ml      Physical Exam  Vitals and nursing note reviewed.   Constitutional:       General: He is not in acute distress.     Appearance: He is normal weight. He is not diaphoretic.   HENT:      Head: Normocephalic and atraumatic.      Nose: Nose normal.   Eyes:      General: No scleral icterus.     Conjunctiva/sclera: Conjunctivae normal.   Cardiovascular:      Rate and Rhythm: Normal rate and regular rhythm.      Heart sounds: Normal heart sounds. No murmur heard.    No friction rub. No gallop.   Pulmonary:      Effort: Pulmonary effort is normal. No respiratory distress.      Breath sounds: Normal breath sounds. No stridor. No wheezing or rales.   Chest:      Chest wall: No tenderness.   Abdominal:      General: Bowel sounds are normal. There is no distension.      Palpations: Abdomen is soft.      Tenderness: There is no abdominal tenderness. There is no guarding or rebound.   Musculoskeletal:         General: No tenderness or deformity. Normal range of motion.      Cervical back: Normal range of motion and neck supple.   Skin:     General: Skin is warm and dry.      Coloration: Skin is not pale.      Findings: No erythema or rash.   Neurological:      Mental Status: He is alert and oriented to person, place, and time.      Cranial Nerves: No cranial nerve deficit.      Motor: No abnormal muscle tone.      Coordination: Coordination normal.   Psychiatric:         Behavior: Behavior normal.         Thought Content: Thought content normal.         Cognition and Memory: Cognition is impaired (very mild).       Significant Labs: All pertinent labs within the past 24 hours have been reviewed.    Significant Imaging: I have reviewed all pertinent imaging results/findings within the past 24 hours.

## 2022-12-05 NOTE — ASSESSMENT & PLAN NOTE
Patient with acute kidney injury likely due to IVVD/dehydration PER is currently stable. Labs reviewed- Renal function/electrolytes with Estimated Creatinine Clearance: 39.9 mL/min (based on SCr of 1 mg/dL). according to latest data.  Additionally CPK noted to be elevated at 2714.  Monitor urine output and serial BMP and adjust therapy as needed. Avoid nephrotoxins and renally dose meds for GFR listed above.   Gentle IV fluid  Strict Is&Os    PER resolved with IV hydration    Follow BMP     12/5:  Resolved

## 2022-12-05 NOTE — PLAN OF CARE
Problem: Adult Inpatient Plan of Care  Goal: Patient-Specific Goal (Individualized)  Outcome: Ongoing, Progressing       Pt pleasantly confused   Afib on tele monitor - rate controled   Blood pressures stable   Afebrile   Unable to obtain orthos    LR at 50  PT/OT/ST   Discharge plan is SNF   Fall precautions in place

## 2022-12-05 NOTE — PROGRESS NOTES
[facility])  [facility] Medicine  Progress Note    Patient Name: Yehuda Sharma  MRN: [id number]  Patient Class: OP- Observation   Admission Date: 12/2/2022  Length of Stay: 0 days  Attending Physician: Maria Del Carmen Olmedo MD  Primary Care Provider: No primary care provider on file.        Subjective:     Principal Problem:Syncope        HPI:  Yehuda Sharma is a 89 y.o. male with hypothyroidism, anemia, dementia presented to ED on 12/2/2022 with syncopal episode.  Per family patient has had multiple falls over the past week.  Additionally they noted that he has had poor oral intake.  Family reports that patient has episodes of confusion, however still does well most of the time.  He lives by himself, and his son lives very close by and checks on him frequently. Denies any complaints including chest pain, however was difficult to get full ROS as patient is extremely hard of hearing.  CT head showed no acute abnormality.  Chest x-ray obtained in the ED was limited due to lack of inclusion of the entire thorax, however there was no pulmonary infiltrate.  Patient was also noted to have abnormal labs with BUN 48, creatinine 1.8, , CPK 2714, troponin 0.058.  Placed in observation to hospital medicine service for syncope and multiple falls.    [facility]    SDM:  SonCarlene    CODE STATUS:  DNR, confirmed by patient's son        Overview/Hospital Course:  Patient placed in observation for near syncope and PER on IVFs. CT head was negative. EKG showed no ischemia. Serial troponin 0.42>0.44>0.036. No chest pain/SOB. No orthostasis. CPK 2714 and pt had IVVD. Near syncope likely 2/2 dehydration. PER now resolved after IVFs. Pt lives alone, however, pt reports he does not feel safe at home alone. PT/OT recommended SNF. Pt/son agreeable to SNF. CM consulted. As of 12/5, patient's mentation remains intact.  Potassium replaced.  Medically clear for sniff placement.      Interval  History:  Resting comfortably.  Pending SNF placement    Review of Systems   Constitutional:  Positive for activity change and fatigue. Negative for appetite change, chills, diaphoresis and fever.   HENT:  Negative for congestion, nosebleeds, sore throat and trouble swallowing.    Eyes:  Negative for pain, discharge and visual disturbance.   Respiratory:  Negative for apnea, cough, chest tightness, shortness of breath, wheezing and stridor.    Cardiovascular:  Negative for chest pain, palpitations and leg swelling.   Gastrointestinal:  Negative for abdominal distention, abdominal pain, blood in stool, constipation, diarrhea, nausea and vomiting.   Endocrine: Negative for cold intolerance and heat intolerance.   Genitourinary:  Negative for difficulty urinating, dysuria, flank pain, frequency and urgency.   Musculoskeletal:  Negative for arthralgias, back pain, joint swelling, myalgias, neck pain and neck stiffness.   Skin:  Negative for rash and wound.   Allergic/Immunologic: Negative for food allergies and immunocompromised state.   Neurological:  Positive for syncope and weakness. Negative for dizziness, seizures, facial asymmetry, light-headedness and headaches.   Hematological:  Negative for adenopathy.   Psychiatric/Behavioral:  Negative for agitation, behavioral problems and confusion. The patient is not nervous/anxious.    Objective:     Vital Signs (Most Recent):  Temp: 98.1 °F (36.7 °C) (12/05/22 1120)  Pulse: 76 (12/05/22 1305)  Resp: 16 (12/05/22 1120)  BP: 136/83 (12/05/22 1120)  SpO2: (!) 92 % (12/05/22 1120)   Vital Signs (24h Range):  Temp:  [97.8 °F (36.6 °C)-98.6 °F (37 °C)] 98.1 °F (36.7 °C)  Pulse:  [70-90] 76  Resp:  [16-18] 16  SpO2:  [90 %-93 %] 92 %  BP: (118-165)/(68-95) 136/83     Weight: 71.4 kg (157 lb 6.5 oz)  Body mass index is 23.93 kg/m².    Intake/Output Summary (Last 24 hours) at 12/5/2022 1456  Last data filed at 12/5/2022 1405  Gross per 24 hour   Intake 1966.04 ml   Output 500 ml    Net 1466.04 ml      Physical Exam  Vitals and nursing note reviewed.   Constitutional:       General: He is not in acute distress.     Appearance: He is normal weight. He is not diaphoretic.   HENT:      Head: Normocephalic and atraumatic.      Nose: Nose normal.   Eyes:      General: No scleral icterus.     Conjunctiva/sclera: Conjunctivae normal.   Cardiovascular:      Rate and Rhythm: Normal rate and regular rhythm.      Heart sounds: Normal heart sounds. No murmur heard.    No friction rub. No gallop.   Pulmonary:      Effort: Pulmonary effort is normal. No respiratory distress.      Breath sounds: Normal breath sounds. No stridor. No wheezing or rales.   Chest:      Chest wall: No tenderness.   Abdominal:      General: Bowel sounds are normal. There is no distension.      Palpations: Abdomen is soft.      Tenderness: There is no abdominal tenderness. There is no guarding or rebound.   Musculoskeletal:         General: No tenderness or deformity. Normal range of motion.      Cervical back: Normal range of motion and neck supple.   Skin:     General: Skin is warm and dry.      Coloration: Skin is not pale.      Findings: No erythema or rash.   Neurological:      Mental Status: He is alert and oriented to person, place, and time.      Cranial Nerves: No cranial nerve deficit.      Motor: No abnormal muscle tone.      Coordination: Coordination normal.   Psychiatric:         Behavior: Behavior normal.         Thought Content: Thought content normal.         Cognition and Memory: Cognition is impaired (very mild).       Significant Labs: All pertinent labs within the past 24 hours have been reviewed.    Significant Imaging: I have reviewed all pertinent imaging results/findings within the past 24 hours.      Assessment/Plan:      * Syncope  Patient placed in observation for near syncope and PER on IVFs. CT head was negative. EKG showed no ischemia. Serial troponin 0.42>0.44>0.036. No chest pain/SOB. No  orthostasis. CPK 2714 and pt had IVVD. Near syncope likely 2/2 dehydration. PER now resolved after IVFs. Pt lives alone, however, pt reports he does not feel safe at home alone. PT/OT recommended SNF. Pt/son agreeable to SNF. CM consulted.     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  Patient with history of dementia as chart review  Family reports that patient has had increasing moments of confusion recently, however still lucid most of the time  Apparently had previously been on donepezil  Supportive care   Delirium precautions    12/5:  Pending SNF placement       Anemia due to vitamin B12 deficiency  On cyanocobalamin injection nightly  Follow CBC, transfuse as needed     12/5:  Hgb stable   No transfusion warranted       Elevated CPK  Likely secondary to falls in setting of hypovolemia  Gentle IV fluid  Trend CPK, CMP    improved    12/5:  CPK 2714>1129>221    PER (acute kidney injury)  Patient with acute kidney injury likely due to IVVD/dehydration PER is currently stable. Labs reviewed- Renal function/electrolytes with Estimated Creatinine Clearance: 39.9 mL/min (based on SCr of 1 mg/dL). according to latest data.  Additionally CPK noted to be elevated at 2714.  Monitor urine output and serial BMP and adjust therapy as needed. Avoid nephrotoxins and renally dose meds for GFR listed above.   Gentle IV fluid  Strict Is&Os    PER resolved with IV hydration    Follow BMP     12/5:  Resolved     Hypothyroidism  Chronic   Continue home dose levothyroxine        VTE Risk Mitigation (From admission, onward)    None          Discharge Planning   KRYSTAL:      Code Status: DNR   Is the patient medically ready for discharge?:     Reason for patient still in hospital (select all that apply): Pending  disposition  Discharge Plan A: Skilled Nursing Facility                  [place] NP  Department of Hospital Medicine   [facility] Tele[facility] ([facility])

## 2022-12-05 NOTE — ASSESSMENT & PLAN NOTE
Patient placed in observation for near syncope and PER on IVFs. CT head was negative. EKG showed no ischemia. Serial troponin 0.42>0.44>0.036. No chest pain/SOB. No orthostasis. CPK 2714 and pt had IVVD. Near syncope likely 2/2 dehydration. PER now resolved after IVFs. Pt lives alone, however, pt reports he does not feel safe at home alone. PT/OT recommended SNF. Pt/son agreeable to SNF. CM consulted.     12/5:  Medically cleared for SNF placement

## 2022-12-05 NOTE — PLAN OF CARE
[facility] (Hospital)  Initial Discharge Assessment       Primary Care Provider: No primary care provider on file.    Admission Diagnosis: Syncope [R55]  Weakness [R53.1]  Elevated troponin [R77.8]  Elevated CPK [R74.8]  Chest pain [R07.9]  Syncope, unspecified syncope type [R55]    Admission Date: 12/2/2022  Expected Discharge Date:     Discharge Barriers Identified: None    Payor: [employer]ARE / Plan: [employer] MEDICARE HMO / Product Type: Capitation /     Extended Emergency Contact Information  Primary Emergency Contact: Richard Sharma  Mobile Phone: [phone]  Relation: Son  Preferred language: English   needed? No    Discharge Plan A: Skilled Nursing Facility       No Pharmacies Listed    Initial Assessment (most recent)       Adult Discharge Assessment - 12/05/22 0958          Discharge Assessment    Assessment Type Discharge Planning Assessment     Confirmed/corrected address, phone number and insurance Yes     Confirmed Demographics Correct on Facesheet     Source of Information family     Communicated KRYSTAL with patient/caregiver Yes     Reason For Admission syncope; hypotension     Lives With alone     Do you expect to return to your current living situation? No     Do you have help at home or someone to help you manage your care at home? Yes     Who are your caregiver(s) and their phone number(s)? Shakir tam     Prior to hospitilization cognitive status: Alert/Oriented     Current cognitive status: Alert/Oriented   has occasional disorientation since in the hospital    Walking or Climbing Stairs Difficulty ambulation difficulty, requires equipment     Mobility Management use rollator     Dressing/Bathing Difficulty none     Equipment Currently Used at Home rollator;grab bar     Readmission within 30 days? No     Patient currently being followed by outpatient case management? No     Do you currently have service(s) that help you manage your care at home? Yes     Name and  Contact number of [facility]     Is the pt/caregiver preference to resume services with current agency Yes     Do you take prescription medications? Yes     Do you have prescription coverage? Yes     Coverage [employer] and [facility]     Do you have any problems affording any of your prescribed medications? No     Is the patient taking medications as prescribed? yes     Who is going to help you get home at discharge? son     How do you get to doctors appointments? family or friend will provide     Are you on dialysis? No     Do you take coumadin? No     Discharge Plan A Skilled Nursing Facility     Discharge Plan discussed with: Adult children     Discharge Barriers Identified None                   Spoke with sonRichard over the phone for discharge assessment.  Patient lives alone and was independent with ADL's with assistive equipment.  His son checks on him frequently.  PT/OT recommending SNF.  Son is in agreement.  Discussed use of [employer] [facility]are vs [facility].  Son prefers to use [employer].  Per son, patient will not return to live alone after SNF.  May place in assisted living.

## 2022-12-05 NOTE — ASSESSMENT & PLAN NOTE
On cyanocobalamin injection nightly  Follow CBC, transfuse as needed     12/5:  Hgb stable   No transfusion warranted

## 2022-12-05 NOTE — PLAN OF CARE
Preference obtained for Yonathan Arredondo, [facility] and [facility].    Referral sent to Yonathan Arredondo via [facility].       12/05/22 1119   Post-Acute Status   Post-Acute Authorization Placement   Post-Acute Placement Status Referrals Sent   Discharge Plan   Discharge Plan A Skilled Nursing Facility     2:00pm Accepted by Yonathan Arredondo and submitted for authorization to [facility].

## 2022-12-06 LAB — SARS-COV-2 RDRP RESP QL NAA+PROBE: NEGATIVE

## 2022-12-06 PROCEDURE — 63600175 PHARM REV CODE 636 W HCPCS: Performed by: INTERNAL MEDICINE

## 2022-12-06 PROCEDURE — G0378 HOSPITAL OBSERVATION PER HR: HCPCS

## 2022-12-06 PROCEDURE — U0002 COVID-19 LAB TEST NON-CDC: HCPCS | Performed by: INTERNAL MEDICINE

## 2022-12-06 PROCEDURE — 94761 N-INVAS EAR/PLS OXIMETRY MLT: CPT

## 2022-12-06 PROCEDURE — 97535 SELF CARE MNGMENT TRAINING: CPT

## 2022-12-06 PROCEDURE — 96375 TX/PRO/DX INJ NEW DRUG ADDON: CPT

## 2022-12-06 PROCEDURE — 97530 THERAPEUTIC ACTIVITIES: CPT

## 2022-12-06 PROCEDURE — 96366 THER/PROPH/DIAG IV INF ADDON: CPT

## 2022-12-06 PROCEDURE — 96372 THER/PROPH/DIAG INJ SC/IM: CPT | Mod: 59 | Performed by: NURSE PRACTITIONER

## 2022-12-06 PROCEDURE — 96361 HYDRATE IV INFUSION ADD-ON: CPT

## 2022-12-06 PROCEDURE — 96365 THER/PROPH/DIAG IV INF INIT: CPT

## 2022-12-06 PROCEDURE — 97530 THERAPEUTIC ACTIVITIES: CPT | Mod: CQ

## 2022-12-06 PROCEDURE — 63600175 PHARM REV CODE 636 W HCPCS: Performed by: NURSE PRACTITIONER

## 2022-12-06 PROCEDURE — 25000003 PHARM REV CODE 250: Performed by: INTERNAL MEDICINE

## 2022-12-06 RX ORDER — TALC
6 POWDER (GRAM) TOPICAL NIGHTLY PRN
Status: DISCONTINUED | OUTPATIENT
Start: 2022-12-06 | End: 2022-12-07

## 2022-12-06 RX ORDER — ENOXAPARIN SODIUM 100 MG/ML
40 INJECTION SUBCUTANEOUS EVERY 24 HOURS
Status: DISCONTINUED | OUTPATIENT
Start: 2022-12-06 | End: 2022-12-08 | Stop reason: HOSPADM

## 2022-12-06 RX ORDER — MAGNESIUM SULFATE HEPTAHYDRATE 40 MG/ML
2 INJECTION, SOLUTION INTRAVENOUS ONCE
Status: COMPLETED | OUTPATIENT
Start: 2022-12-06 | End: 2022-12-06

## 2022-12-06 RX ORDER — HYDRALAZINE HYDROCHLORIDE 20 MG/ML
10 INJECTION INTRAMUSCULAR; INTRAVENOUS EVERY 6 HOURS PRN
Status: DISCONTINUED | OUTPATIENT
Start: 2022-12-06 | End: 2022-12-08 | Stop reason: HOSPADM

## 2022-12-06 RX ADMIN — SODIUM CHLORIDE, POTASSIUM CHLORIDE, SODIUM LACTATE AND CALCIUM CHLORIDE: 600; 310; 30; 20 INJECTION, SOLUTION INTRAVENOUS at 08:12

## 2022-12-06 RX ADMIN — LEVOTHYROXINE SODIUM 112 MCG: 0.11 TABLET ORAL at 05:12

## 2022-12-06 RX ADMIN — ENOXAPARIN SODIUM 40 MG: 40 INJECTION SUBCUTANEOUS at 05:12

## 2022-12-06 RX ADMIN — IRON SUCROSE 100 MG: 20 INJECTION, SOLUTION INTRAVENOUS at 08:12

## 2022-12-06 RX ADMIN — MAGNESIUM SULFATE HEPTAHYDRATE 2 G: 40 INJECTION, SOLUTION INTRAVENOUS at 09:12

## 2022-12-06 NOTE — SUBJECTIVE & OBJECTIVE
Interval History:  patient medically stable for discharge to SNF. Mentation wise he is oriented to person and place.     Review of Systems   Constitutional:  Positive for activity change and fatigue. Negative for appetite change, chills, diaphoresis and fever.   HENT:  Negative for congestion, nosebleeds, sore throat and trouble swallowing.    Eyes:  Negative for pain, discharge and visual disturbance.   Respiratory:  Negative for apnea, cough, chest tightness, shortness of breath, wheezing and stridor.    Cardiovascular:  Negative for chest pain, palpitations and leg swelling.   Gastrointestinal:  Negative for abdominal distention, abdominal pain, blood in stool, constipation, diarrhea, nausea and vomiting.   Endocrine: Negative for cold intolerance and heat intolerance.   Genitourinary:  Negative for difficulty urinating, dysuria, flank pain, frequency and urgency.   Musculoskeletal:  Negative for arthralgias, back pain, joint swelling, myalgias, neck pain and neck stiffness.   Skin:  Negative for rash and wound.   Allergic/Immunologic: Negative for food allergies and immunocompromised state.   Neurological:  Positive for syncope and weakness. Negative for dizziness, seizures, facial asymmetry, light-headedness and headaches.   Hematological:  Negative for adenopathy.   Psychiatric/Behavioral:  Negative for agitation, behavioral problems and confusion. The patient is not nervous/anxious.    Objective:     Vital Signs (Most Recent):  Temp: 97.6 °F (36.4 °C) (12/06/22 0831)  Pulse: 82 (12/06/22 0940)  Resp: 16 (12/06/22 0831)  BP: (!) 156/92 (12/06/22 0831)  SpO2: (!) 93 % (12/06/22 0831)   Vital Signs (24h Range):  Temp:  [97.3 °F (36.3 °C)-97.9 °F (36.6 °C)] 97.6 °F (36.4 °C)  Pulse:  [66-99] 82  Resp:  [16-18] 16  SpO2:  [90 %-94 %] 93 %  BP: (122-157)/(69-96) 156/92     Weight: 71.4 kg (157 lb 6.5 oz)  Body mass index is 23.93 kg/m².    Intake/Output Summary (Last 24 hours) at 12/6/2022 1135  Last data filed at  12/6/2022 0940  Gross per 24 hour   Intake 500 ml   Output 500 ml   Net 0 ml      Physical Exam  Vitals and nursing note reviewed.   Constitutional:       General: He is not in acute distress.     Appearance: He is normal weight. He is not diaphoretic.   HENT:      Head: Normocephalic and atraumatic.      Nose: Nose normal.   Eyes:      General: No scleral icterus.     Conjunctiva/sclera: Conjunctivae normal.   Cardiovascular:      Rate and Rhythm: Normal rate and regular rhythm.      Heart sounds: Normal heart sounds. No murmur heard.    No friction rub. No gallop.   Pulmonary:      Effort: Pulmonary effort is normal. No respiratory distress.      Breath sounds: Normal breath sounds. No stridor. No wheezing or rales.   Chest:      Chest wall: No tenderness.   Abdominal:      General: Bowel sounds are normal. There is no distension.      Palpations: Abdomen is soft.      Tenderness: There is no abdominal tenderness. There is no guarding or rebound.   Musculoskeletal:         General: No tenderness or deformity. Normal range of motion.      Cervical back: Normal range of motion and neck supple.   Skin:     General: Skin is warm and dry.      Coloration: Skin is not pale.      Findings: No erythema or rash.   Neurological:      Mental Status: He is alert. Mental status is at baseline.      Cranial Nerves: No cranial nerve deficit.      Motor: No abnormal muscle tone.      Coordination: Coordination normal.   Psychiatric:         Behavior: Behavior normal.         Thought Content: Thought content normal.         Cognition and Memory: Cognition is impaired (very mild).       Significant Labs: All pertinent labs within the past 24 hours have been reviewed.    Significant Imaging: I have reviewed all pertinent imaging results/findings within the past 24 hours.

## 2022-12-06 NOTE — PT/OT/SLP PROGRESS
Physical Therapy  Treatment    Yehuda Sharma   MRN: [id number]   Admitting Diagnosis: Syncope    PT Received On: 12/06/22  PT Start Time: 1050     PT Stop Time: 1135    PT Total Time (min): 45 min       Billable Minutes:  Therapeutic Activity 45    Treatment Type: Treatment  PT/PTA: PTA     PTA Visit Number: 1       General Precautions: Standard, fall  Orthopedic Precautions: N/A  Braces: N/A  Respiratory Status: Room air         Subjective:  Communicated with patient's nurse, Melvin, and completed Epic chart review prior to session.  Patient agreed to PT session and requested assistance to T/F to BSC.    Pain/Comfort  Pain Rating 1: 0/10  Pain Rating Post-Intervention 1: 0/10    Objective:   Patient found with: telemetry, peripheral IV, Other (comments) (AVASYS)    Supine > sit EOB: Min A (increased time to complete)    Seated EOB x10 min total focusing on increased tolerance to upright position, core stability, trunk control and CV endurance.   Maintained self supported sitting balance w/ SPV    STS from EOB > RW x2 trials: Mod A (VC for hand placement)    Stand pivot T/F EOB <> BSC: Mod A (constant VC for safety w/ RW mgmt and sequencing of task)    Patient remained on BSC for ~10 min attempting to have a BM. Due to patient's level of cognition and heightened fall risk, patient could not be left alone.     STS from BSC > RW: Mod A (VC for hand placement)    Static stand w/ BUE support on RW x2 min total while therapist cleaned patient and changed brief  CGA-Min A to maintain standing balance    Stand pivot EOB > chair w/ RW: Mod A (constant VC for safety w/ RW mgmt and sequencing of task)    Educated patient on importance of increased tolerance to upright position and direct impact on CV endurance and strength. Patient encouraged to sit up in chair for a minimum of 2 consecutive hours per day. Encouraged patient to perform AROM TE to BLE throughout the day within all available planes of motion. Re enforced  importance of utilizing call light to meet needs in room and not attempt to get up without staff assistance. Patient verbalized understanding and agreed to comply.      AM-PAC 6 CLICK MOBILITY  How much help from another person does this patient currently need?   1 = Unable, Total/Dependent Assistance  2 = A lot, Maximum/Moderate Assistance  3 = A little, Minimum/Contact Guard/Supervision  4 = None, Modified [place]/Independent    Turning over in bed (including adjusting bedclothes, sheets and blankets)?: 3  Sitting down on and standing up from a chair with arms (e.g., wheelchair, bedside commode, etc.): 2  Moving from lying on back to sitting on the side of the bed?: 3  Moving to and from a bed to a chair (including a wheelchair)?: 2  Need to walk in hospital room?: 2  Climbing 3-5 steps with a railing?: 1  Basic Mobility Total Score: 13    AM-PAC Raw Score CMS G-Code Modifier Level of Impairment Assistance   6 % Total / Unable   7 - 9 CM 80 - 100% Maximal Assist   10 - 14 CL 60 - 80% Moderate Assist   15 - 19 CK 40 - 60% Moderate Assist   20 - 22 CJ 20 - 40% Minimal Assist   23 CI 1-20% SBA / CGA   24 CH 0% Independent/ Mod I     Patient left up in chair with call button in reach, chair alarm on, nurse notified, and AVASYS present.    Assessment:  Yehuda Sharma is a 89 y.o. male with a medical diagnosis of Syncope and presents with overall decline in functional mobility. Patient would continue to benefit from skilled PT to address functional limitations listed below in order to return to PLOF/decrease caregiver burden. Patient is overall very pleasant and willing to participate but is highly distractible and requires frequent redirection towards tasks.     Rehab identified problem list/impairments: weakness, impaired endurance, impaired self care skills, impaired functional mobility, gait instability, impaired balance, impaired cognition, decreased coordination, decreased upper extremity function,  decreased lower extremity function, decreased safety awareness, decreased ROM, impaired cardiopulmonary response to activity    Rehab potential is fair.    Activity tolerance: Fair    Discharge recommendations: nursing facility, skilled      Barriers to discharge:      Equipment recommendations: other (see comments) (TBD BY NEXT LEVEL OF CARE)     GOALS:   Multidisciplinary Problems       Physical Therapy Goals          Problem: Physical Therapy    Goal Priority Disciplines Outcome Goal Variances Interventions   Physical Therapy Goal     PT, PT/OT Ongoing, Progressing     Description: PT eval complete. The following goals will be met in 14 days  1. PT IND with bed mobility  2. PT min A with transfer with RW  3. Pt amb 75 ft with RW and CGA                       PLAN:    Patient to be seen 3 x/week to address the above listed problems via gait training, therapeutic activities, therapeutic exercises  Plan of Care expires: 12/17/22  Plan of Care reviewed with: patient         12/06/2022

## 2022-12-06 NOTE — PROGRESS NOTES
[facility])  [facility] Medicine  Progress Note    Patient Name: Yehuda Sharma  MRN: [id number]  Patient Class: OP- Observation   Admission Date: 12/2/2022  Length of Stay: 0 days  Attending Physician: Maria Del Carmen Olmedo MD  Primary Care Provider: No primary care provider on file.        Subjective:     Principal Problem:Syncope        HPI:  Yehuda Sharma is a 89 y.o. male with hypothyroidism, anemia, dementia presented to ED on 12/2/2022 with syncopal episode.  Per family patient has had multiple falls over the past week.  Additionally they noted that he has had poor oral intake.  Family reports that patient has episodes of confusion, however still does well most of the time.  He lives by himself, and his son lives very close by and checks on him frequently. Denies any complaints including chest pain, however was difficult to get full ROS as patient is extremely hard of hearing.  CT head showed no acute abnormality.  Chest x-ray obtained in the ED was limited due to lack of inclusion of the entire thorax, however there was no pulmonary infiltrate.  Patient was also noted to have abnormal labs with BUN 48, creatinine 1.8, , CPK 2714, troponin 0.058.  Placed in observation to hospital medicine service for syncope and multiple falls.    PCP:  [facility]ro[facility]    SDM:  SonCarlene    CODE STATUS:  DNR, confirmed by patient's son        Overview/Hospital Course:  Patient placed in observation for near syncope and PER on IVFs. CT head was negative. EKG showed no ischemia. Serial troponin 0.42>0.44>0.036. No chest pain/SOB. No orthostasis. CPK 2714 and pt had IVVD. Near syncope likely 2/2 dehydration. PER now resolved after IVFs. Pt lives alone, however, pt reports he does not feel safe at home alone. PT/OT recommended SNF. Pt/son agreeable to SNF. CM consulted. As of 12/5, patient's mentation remains intact.  Potassium replaced.  Medically clear for sniff placement. As of 12/6  patient medically stable for discharge to SNF. Mentation wise he is oriented to person and place. Appetite is intact as well.       Interval History:  patient medically stable for discharge to SNF. Mentation wise he is oriented to person and place.     Review of Systems   Constitutional:  Positive for activity change and fatigue. Negative for appetite change, chills, diaphoresis and fever.   HENT:  Negative for congestion, nosebleeds, sore throat and trouble swallowing.    Eyes:  Negative for pain, discharge and visual disturbance.   Respiratory:  Negative for apnea, cough, chest tightness, shortness of breath, wheezing and stridor.    Cardiovascular:  Negative for chest pain, palpitations and leg swelling.   Gastrointestinal:  Negative for abdominal distention, abdominal pain, blood in stool, constipation, diarrhea, nausea and vomiting.   Endocrine: Negative for cold intolerance and heat intolerance.   Genitourinary:  Negative for difficulty urinating, dysuria, flank pain, frequency and urgency.   Musculoskeletal:  Negative for arthralgias, back pain, joint swelling, myalgias, neck pain and neck stiffness.   Skin:  Negative for rash and wound.   Allergic/Immunologic: Negative for food allergies and immunocompromised state.   Neurological:  Positive for syncope and weakness. Negative for dizziness, seizures, facial asymmetry, light-headedness and headaches.   Hematological:  Negative for adenopathy.   Psychiatric/Behavioral:  Negative for agitation, behavioral problems and confusion. The patient is not nervous/anxious.    Objective:     Vital Signs (Most Recent):  Temp: 97.6 °F (36.4 °C) (12/06/22 0831)  Pulse: 82 (12/06/22 0940)  Resp: 16 (12/06/22 0831)  BP: (!) 156/92 (12/06/22 0831)  SpO2: (!) 93 % (12/06/22 0831)   Vital Signs (24h Range):  Temp:  [97.3 °F (36.3 °C)-97.9 °F (36.6 °C)] 97.6 °F (36.4 °C)  Pulse:  [66-99] 82  Resp:  [16-18] 16  SpO2:  [90 %-94 %] 93 %  BP: (122-157)/(69-96) 156/92     Weight: 71.4  kg (157 lb 6.5 oz)  Body mass index is 23.93 kg/m².    Intake/Output Summary (Last 24 hours) at 12/6/2022 1137  Last data filed at 12/6/2022 0940  Gross per 24 hour   Intake 500 ml   Output 500 ml   Net 0 ml      Physical Exam  Vitals and nursing note reviewed.   Constitutional:       General: He is not in acute distress.     Appearance: He is normal weight. He is not diaphoretic.   HENT:      Head: Normocephalic and atraumatic.      Nose: Nose normal.   Eyes:      General: No scleral icterus.     Conjunctiva/sclera: Conjunctivae normal.   Cardiovascular:      Rate and Rhythm: Normal rate and regular rhythm.      Heart sounds: Normal heart sounds. No murmur heard.    No friction rub. No gallop.   Pulmonary:      Effort: Pulmonary effort is normal. No respiratory distress.      Breath sounds: Normal breath sounds. No stridor. No wheezing or rales.   Chest:      Chest wall: No tenderness.   Abdominal:      General: Bowel sounds are normal. There is no distension.      Palpations: Abdomen is soft.      Tenderness: There is no abdominal tenderness. There is no guarding or rebound.   Musculoskeletal:         General: No tenderness or deformity. Normal range of motion.      Cervical back: Normal range of motion and neck supple.   Skin:     General: Skin is warm and dry.      Coloration: Skin is not pale.      Findings: No erythema or rash.   Neurological:      Mental Status: He is alert. Mental status is at baseline.      Cranial Nerves: No cranial nerve deficit.      Motor: No abnormal muscle tone.      Coordination: Coordination normal.   Psychiatric:         Behavior: Behavior normal.         Thought Content: Thought content normal.         Cognition and Memory: Cognition is impaired (very mild).       Significant Labs: All pertinent labs within the past 24 hours have been reviewed.    Significant Imaging: I have reviewed all pertinent imaging results/findings within the past 24 hours.      Assessment/Plan:      *  Syncope  Patient placed in observation for near syncope and PER on IVFs. CT head was negative. EKG showed no ischemia. Serial troponin 0.42>0.44>0.036. No chest pain/SOB. No orthostasis. CPK 2714 and pt had IVVD. Near syncope likely 2/2 dehydration. PER now resolved after IVFs. Pt lives alone, however, pt reports he does not feel safe at home alone. PT/OT recommended SNF. Pt/son agreeable to SNF. CM consulted.     12/6:  Medically cleared for SNF placement     Elevated troponin  Likely troponin leak secondary to PER and elevated CPK  Serial troponin's trended down-No ACS  Denies any chest pain or SOB       Alzheimer's type dementia with late onset without behavioral disturbance  Patient with history of dementia as chart review  Family reports that patient has had increasing moments of confusion recently, however still lucid most of the time  Apparently had previously been on donepezil  Supportive care   Delirium precautions    12/5:  Pending SNF placement       Anemia due to vitamin B12 deficiency  On cyanocobalamin injection nightly  Follow CBC, transfuse as needed           Elevated CPK  Likely secondary to falls in setting of hypovolemia  Gentle IV fluid  Trend CPK, CMP    improved    12/5:  CPK 2714>1129>221    PER (acute kidney injury)  Patient with acute kidney injury likely due to IVVD/dehydration PER is currently stable. Labs reviewed- Renal function/electrolytes with Estimated Creatinine Clearance: 39.9 mL/min (based on SCr of 1 mg/dL). according to latest data.  Additionally CPK noted to be elevated at 2714.  Monitor urine output and serial BMP and adjust therapy as needed. Avoid nephrotoxins and renally dose meds for GFR listed above.   Gentle IV fluid  Strict Is&Os    PER resolved with IV hydration    Follow BMP     12/5:  Resolved     Hypothyroidism  Chronic   Continue home dose levothyroxine      VTE Risk Mitigation (From admission, onward)         Ordered     enoxaparin injection 40 mg  Daily          12/06/22 1141     Place sequential compression device  Until discontinued         12/06/22 1141                Discharge Planning   KRYSTAL:      Code Status: DNR   Is the patient medically ready for discharge?:     Reason for patient still in hospital (select all that apply): Pending disposition  Discharge Plan A: Skilled Nursing Facility                  [place] NP  Department of Hospital Medicine   [facility] ([facility])

## 2022-12-06 NOTE — ASSESSMENT & PLAN NOTE
Patient placed in observation for near syncope and PER on IVFs. CT head was negative. EKG showed no ischemia. Serial troponin 0.42>0.44>0.036. No chest pain/SOB. No orthostasis. CPK 2714 and pt had IVVD. Near syncope likely 2/2 dehydration. PER now resolved after IVFs. Pt lives alone, however, pt reports he does not feel safe at home alone. PT/OT recommended SNF. Pt/son agreeable to SNF. CM consulted.     12/6:  Medically cleared for SNF placement

## 2022-12-07 PROBLEM — E87.6 HYPOKALEMIA: Status: ACTIVE | Noted: 2022-12-07

## 2022-12-07 LAB
ALBUMIN SERPL BCP-MCNC: 2.8 G/DL (ref 3.5–5.2)
ALP SERPL-CCNC: 71 U/L (ref 55–135)
ALT SERPL W/O P-5'-P-CCNC: 12 U/L (ref 10–44)
ANION GAP SERPL CALC-SCNC: 10 MMOL/L (ref 8–16)
AST SERPL-CCNC: 19 U/L (ref 10–40)
BILIRUB SERPL-MCNC: 0.8 MG/DL (ref 0.1–1)
BUN SERPL-MCNC: 16 MG/DL (ref 10–30)
CALCIUM SERPL-MCNC: 8.1 MG/DL (ref 8.7–10.5)
CHLORIDE SERPL-SCNC: 103 MMOL/L (ref 95–110)
CO2 SERPL-SCNC: 26 MMOL/L (ref 23–29)
CREAT SERPL-MCNC: 0.8 MG/DL (ref 0.5–1.4)
EST. GFR  (NO RACE VARIABLE): >60 ML/MIN/1.73 M^2
GLUCOSE SERPL-MCNC: 110 MG/DL (ref 70–110)
MAGNESIUM SERPL-MCNC: 2.2 MG/DL (ref 1.6–2.6)
POTASSIUM SERPL-SCNC: 3.2 MMOL/L (ref 3.5–5.1)
PROT SERPL-MCNC: 5.6 G/DL (ref 6–8.4)
SODIUM SERPL-SCNC: 139 MMOL/L (ref 136–145)

## 2022-12-07 PROCEDURE — G0378 HOSPITAL OBSERVATION PER HR: HCPCS

## 2022-12-07 PROCEDURE — 36415 COLL VENOUS BLD VENIPUNCTURE: CPT | Performed by: INTERNAL MEDICINE

## 2022-12-07 PROCEDURE — 63600175 PHARM REV CODE 636 W HCPCS: Performed by: INTERNAL MEDICINE

## 2022-12-07 PROCEDURE — 99900035 HC TECH TIME PER 15 MIN (STAT)

## 2022-12-07 PROCEDURE — 36415 COLL VENOUS BLD VENIPUNCTURE: CPT | Performed by: NURSE PRACTITIONER

## 2022-12-07 PROCEDURE — 25000003 PHARM REV CODE 250: Performed by: NURSE PRACTITIONER

## 2022-12-07 PROCEDURE — 83735 ASSAY OF MAGNESIUM: CPT | Performed by: INTERNAL MEDICINE

## 2022-12-07 PROCEDURE — 25000003 PHARM REV CODE 250: Performed by: INTERNAL MEDICINE

## 2022-12-07 PROCEDURE — 96366 THER/PROPH/DIAG IV INF ADDON: CPT

## 2022-12-07 PROCEDURE — 63600175 PHARM REV CODE 636 W HCPCS: Performed by: NURSE PRACTITIONER

## 2022-12-07 PROCEDURE — 80053 COMPREHEN METABOLIC PANEL: CPT | Performed by: NURSE PRACTITIONER

## 2022-12-07 RX ORDER — POTASSIUM CHLORIDE 20 MEQ/1
40 TABLET, EXTENDED RELEASE ORAL ONCE
Status: COMPLETED | OUTPATIENT
Start: 2022-12-07 | End: 2022-12-07

## 2022-12-07 RX ORDER — SODIUM CHLORIDE 9 MG/ML
INJECTION, SOLUTION INTRAVENOUS
Status: DISCONTINUED | OUTPATIENT
Start: 2022-12-07 | End: 2022-12-08 | Stop reason: HOSPADM

## 2022-12-07 RX ORDER — TALC
6 POWDER (GRAM) TOPICAL NIGHTLY
Status: DISCONTINUED | OUTPATIENT
Start: 2022-12-07 | End: 2022-12-08 | Stop reason: HOSPADM

## 2022-12-07 RX ORDER — AMLODIPINE BESYLATE 5 MG/1
5 TABLET ORAL DAILY
Status: DISCONTINUED | OUTPATIENT
Start: 2022-12-07 | End: 2022-12-08 | Stop reason: HOSPADM

## 2022-12-07 RX ORDER — HALOPERIDOL 5 MG/ML
2 INJECTION INTRAMUSCULAR ONCE
Status: COMPLETED | OUTPATIENT
Start: 2022-12-07 | End: 2022-12-07

## 2022-12-07 RX ORDER — MAGNESIUM SULFATE HEPTAHYDRATE 40 MG/ML
2 INJECTION, SOLUTION INTRAVENOUS ONCE
Status: COMPLETED | OUTPATIENT
Start: 2022-12-07 | End: 2022-12-07

## 2022-12-07 RX ADMIN — MAGNESIUM SULFATE HEPTAHYDRATE 2 G: 40 INJECTION, SOLUTION INTRAVENOUS at 12:12

## 2022-12-07 RX ADMIN — POTASSIUM CHLORIDE 40 MEQ: 1500 TABLET, EXTENDED RELEASE ORAL at 11:12

## 2022-12-07 RX ADMIN — LEVOTHYROXINE SODIUM 112 MCG: 0.11 TABLET ORAL at 06:12

## 2022-12-07 RX ADMIN — HALOPERIDOL LACTATE 2 MG: 5 INJECTION, SOLUTION INTRAMUSCULAR at 05:12

## 2022-12-07 RX ADMIN — SODIUM CHLORIDE: 0.9 INJECTION, SOLUTION INTRAVENOUS at 12:12

## 2022-12-07 NOTE — PLAN OF CARE
Received authorization from [employer].  Patient unable to transfer until tomorrow as family is going in the morning to sign admission paperwork.       12/07/22 1510   Post-Acute Status   Post-Acute Authorization Placement   Post-Acute Placement Status Set-up Complete/Auth obtained   Discharge Plan   Discharge Plan A Skilled Nursing Facility

## 2022-12-07 NOTE — SUBJECTIVE & OBJECTIVE
Interval History: Resting in bed. Still has a good sense of humor    Review of Systems   Constitutional:  Positive for activity change and fatigue. Negative for appetite change, chills, diaphoresis and fever.   HENT:  Negative for congestion, nosebleeds, sore throat and trouble swallowing.    Eyes:  Negative for pain, discharge and visual disturbance.   Respiratory:  Negative for apnea, cough, chest tightness, shortness of breath, wheezing and stridor.    Cardiovascular:  Negative for chest pain, palpitations and leg swelling.   Gastrointestinal:  Negative for abdominal distention, abdominal pain, blood in stool, constipation, diarrhea, nausea and vomiting.   Endocrine: Negative for cold intolerance and heat intolerance.   Genitourinary:  Negative for difficulty urinating, dysuria, flank pain, frequency and urgency.   Musculoskeletal:  Negative for arthralgias, back pain, joint swelling, myalgias, neck pain and neck stiffness.   Skin:  Negative for rash and wound.   Allergic/Immunologic: Negative for food allergies and immunocompromised state.   Neurological:  Positive for syncope and weakness. Negative for dizziness, seizures, facial asymmetry, light-headedness and headaches.   Hematological:  Negative for adenopathy.   Psychiatric/Behavioral:  Negative for agitation, behavioral problems and confusion. The patient is not nervous/anxious.    Objective:     Vital Signs (Most Recent):  Temp: 97.8 °F (36.6 °C) (12/07/22 0937)  Pulse: 75 (12/07/22 0937)  Resp: 18 (12/07/22 0937)  BP: (!) 153/81 (12/07/22 0937)  SpO2: 95 % (12/07/22 0937)   Vital Signs (24h Range):  Temp:  [97.4 °F (36.3 °C)-97.8 °F (36.6 °C)] 97.8 °F (36.6 °C)  Pulse:  [] 75  Resp:  [16-20] 18  SpO2:  [90 %-96 %] 95 %  BP: (105-163)/() 153/81     Weight: 71.4 kg (157 lb 6.5 oz)  Body mass index is 23.93 kg/m².    Intake/Output Summary (Last 24 hours) at 12/7/2022 1103  Last data filed at 12/6/2022 2145  Gross per 24 hour   Intake 1061.02 ml    Output 500 ml   Net 561.02 ml      Physical Exam  Vitals and nursing note reviewed.   Constitutional:       General: He is not in acute distress.     Appearance: He is normal weight. He is not diaphoretic.   HENT:      Head: Normocephalic and atraumatic.      Nose: Nose normal.   Eyes:      General: No scleral icterus.     Conjunctiva/sclera: Conjunctivae normal.   Cardiovascular:      Rate and Rhythm: Normal rate and regular rhythm.      Heart sounds: Normal heart sounds. No murmur heard.    No friction rub. No gallop.   Pulmonary:      Effort: Pulmonary effort is normal. No respiratory distress.      Breath sounds: Normal breath sounds. No stridor. No wheezing or rales.   Chest:      Chest wall: No tenderness.   Abdominal:      General: Bowel sounds are normal. There is no distension.      Palpations: Abdomen is soft.      Tenderness: There is no abdominal tenderness. There is no guarding or rebound.   Musculoskeletal:         General: No tenderness or deformity. Normal range of motion.      Cervical back: Normal range of motion and neck supple.   Skin:     General: Skin is warm and dry.      Coloration: Skin is not pale.      Findings: No erythema or rash.   Neurological:      Mental Status: He is alert. Mental status is at baseline.      Cranial Nerves: No cranial nerve deficit.      Motor: No abnormal muscle tone.      Coordination: Coordination normal.   Psychiatric:         Behavior: Behavior normal.         Thought Content: Thought content normal.         Cognition and Memory: Cognition is impaired (very mild).       Significant Labs: All pertinent labs within the past 24 hours have been reviewed.    Significant Imaging: I have reviewed all pertinent imaging results/findings within the past 24 hours.

## 2022-12-07 NOTE — PROGRESS NOTES
[facility] Medicine  Progress Note    Patient Name: Yehuda Sharma  MRN: [id number]  Patient Class: OP- Observation   Admission Date: 12/2/2022  Length of Stay: 0 days  Attending Physician: Maria Del Carmen Olmedo MD  Primary Care Provider: Primary Doctor No        Subjective:     Principal Problem:Syncope        HPI:  Yehuda Sharma is a 89 y.o. male with hypothyroidism, anemia, dementia presented to ED on 12/2/2022 with syncopal episode.  Per family patient has had multiple falls over the past week.  Additionally they noted that he has had poor oral intake.  Family reports that patient has episodes of confusion, however still does well most of the time.  He lives by himself, and his son lives very close by and checks on him frequently. Denies any complaints including chest pain, however was difficult to get full ROS as patient is extremely hard of hearing.  CT head showed no acute abnormality.  Chest x-ray obtained in the ED was limited due to lack of inclusion of the entire thorax, however there was no pulmonary infiltrate.  Patient was also noted to have abnormal labs with BUN 48, creatinine 1.8, , CPK 2714, troponin 0.058.  Placed in observation to hospital medicine service for syncope and multiple falls.    PCP:  [facility]    SDM:  Son, Carlene Sharma    CODE STATUS:  DNR, confirmed by patient's son        Overview/Hospital Course:  Patient placed in observation for near syncope and PER on IVFs. CT head was negative. EKG showed no ischemia. Serial troponin 0.42>0.44>0.036. No chest pain/SOB. No orthostasis. CPK 2714 and pt had IVVD. Near syncope likely 2/2 dehydration. PER now resolved after IVFs. Pt lives alone, however, pt reports he does not feel safe at home alone. PT/OT recommended SNF. Pt/son agreeable to SNF. CM consulted. Patient's mentation remains intact.  Potassium replaced.  Medically clear for sniff placement. As of 12/6 patient medically stable for  discharge to SNF. Mentation wise he is oriented to person and place. Appetite is intact as well.       Interval History: Resting in bed. Still has a good sense of humor    Review of Systems   Constitutional:  Positive for activity change and fatigue. Negative for appetite change, chills, diaphoresis and fever.   HENT:  Negative for congestion, nosebleeds, sore throat and trouble swallowing.    Eyes:  Negative for pain, discharge and visual disturbance.   Respiratory:  Negative for apnea, cough, chest tightness, shortness of breath, wheezing and stridor.    Cardiovascular:  Negative for chest pain, palpitations and leg swelling.   Gastrointestinal:  Negative for abdominal distention, abdominal pain, blood in stool, constipation, diarrhea, nausea and vomiting.   Endocrine: Negative for cold intolerance and heat intolerance.   Genitourinary:  Negative for difficulty urinating, dysuria, flank pain, frequency and urgency.   Musculoskeletal:  Negative for arthralgias, back pain, joint swelling, myalgias, neck pain and neck stiffness.   Skin:  Negative for rash and wound.   Allergic/Immunologic: Negative for food allergies and immunocompromised state.   Neurological:  Positive for syncope and weakness. Negative for dizziness, seizures, facial asymmetry, light-headedness and headaches.   Hematological:  Negative for adenopathy.   Psychiatric/Behavioral:  Negative for agitation, behavioral problems and confusion. The patient is not nervous/anxious.    Objective:     Vital Signs (Most Recent):  Temp: 97.8 °F (36.6 °C) (12/07/22 0937)  Pulse: 75 (12/07/22 0937)  Resp: 18 (12/07/22 0937)  BP: (!) 153/81 (12/07/22 0937)  SpO2: 95 % (12/07/22 0937)   Vital Signs (24h Range):  Temp:  [97.4 °F (36.3 °C)-97.8 °F (36.6 °C)] 97.8 °F (36.6 °C)  Pulse:  [] 75  Resp:  [16-20] 18  SpO2:  [90 %-96 %] 95 %  BP: (105-163)/() 153/81     Weight: 71.4 kg (157 lb 6.5 oz)  Body mass index is 23.93 kg/m².    Intake/Output Summary (Last  24 hours) at 12/7/2022 1103  Last data filed at 12/6/2022 2145  Gross per 24 hour   Intake 1061.02 ml   Output 500 ml   Net 561.02 ml      Physical Exam  Vitals and nursing note reviewed.   Constitutional:       General: He is not in acute distress.     Appearance: He is normal weight. He is not diaphoretic.   HENT:      Head: Normocephalic and atraumatic.      Nose: Nose normal.   Eyes:      General: No scleral icterus.     Conjunctiva/sclera: Conjunctivae normal.   Cardiovascular:      Rate and Rhythm: Normal rate and regular rhythm.      Heart sounds: Normal heart sounds. No murmur heard.    No friction rub. No gallop.   Pulmonary:      Effort: Pulmonary effort is normal. No respiratory distress.      Breath sounds: Normal breath sounds. No stridor. No wheezing or rales.   Chest:      Chest wall: No tenderness.   Abdominal:      General: Bowel sounds are normal. There is no distension.      Palpations: Abdomen is soft.      Tenderness: There is no abdominal tenderness. There is no guarding or rebound.   Musculoskeletal:         General: No tenderness or deformity. Normal range of motion.      Cervical back: Normal range of motion and neck supple.   Skin:     General: Skin is warm and dry.      Coloration: Skin is not pale.      Findings: No erythema or rash.   Neurological:      Mental Status: He is alert. Mental status is at baseline.      Cranial Nerves: No cranial nerve deficit.      Motor: No abnormal muscle tone.      Coordination: Coordination normal.   Psychiatric:         Behavior: Behavior normal.         Thought Content: Thought content normal.         Cognition and Memory: Cognition is impaired (very mild).       Significant Labs: All pertinent labs within the past 24 hours have been reviewed.    Significant Imaging: I have reviewed all pertinent imaging results/findings within the past 24 hours.      Assessment/Plan:      * Syncope  Patient placed in observation for near syncope and PER on IVFs. CT  head was negative. EKG showed no ischemia. Serial troponin 0.42>0.44>0.036. No chest pain/SOB. No orthostasis. CPK 2714 and pt had IVVD. Near syncope likely 2/2 dehydration. PER now resolved after IVFs. Pt lives alone, however, pt reports he does not feel safe at home alone. PT/OT recommended SNF. Pt/son agreeable to SNF. CM consulted.     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 behavioral disturbance  Patient with history of dementia as chart review  Family reports that patient has had increasing moments of confusion recently, however still lucid most of the time  Apparently had previously been on donepezil  Supportive care   Delirium precautions    12/5:  Pending SNF placement       Anemia due to vitamin B12 deficiency  On cyanocobalamin injection nightly  Follow CBC, transfuse as needed           Elevated CPK  Likely secondary to falls in setting of hypovolemia  Gentle IV fluid  Trend CPK, CMP    improved    12/5:  CPK 2714>1129>221    PER (acute kidney injury)  Patient with acute kidney injury likely due to IVVD/dehydration PER is currently stable. Labs reviewed- Renal function/electrolytes with Estimated Creatinine Clearance: 49.9 mL/min (based on SCr of 0.8 mg/dL). according to latest data.  Additionally CPK noted to be elevated at 2714.  Monitor urine output and serial BMP and adjust therapy as needed. Avoid nephrotoxins and renally dose meds for GFR listed above.   Gentle IV fluid  Strict Is&Os    PER resolved with IV hydration    Follow BMP     12/5:  Resolved     Hypothyroidism  Chronic   Continue home dose levothyroxine        VTE Risk Mitigation (From admission, onward)         Ordered     enoxaparin injection 40 mg  Daily          12/06/22 1141     Place sequential compression device  Until discontinued         12/06/22 1141                Discharge Planning   KRYSTAL: 12/7/2022     Code Status: DNR   Is the patient medically ready for discharge?:     Reason for patient still in hospital (select all that apply): Pending disposition  Discharge Plan A: (P) Skilled Nursing Facility                  M[place]ory [place] NP  Department of Hospital Medicine   [facility] ([facility])

## 2022-12-07 NOTE — ASSESSMENT & PLAN NOTE
Patient with acute kidney injury likely due to IVVD/dehydration PER is currently stable. Labs reviewed- Renal function/electrolytes with Estimated Creatinine Clearance: 49.9 mL/min (based on SCr of 0.8 mg/dL). according to latest data.  Additionally CPK noted to be elevated at 2714.  Monitor urine output and serial BMP and adjust therapy as needed. Avoid nephrotoxins and renally dose meds for GFR listed above.   Gentle IV fluid  Strict Is&Os    PER resolved with IV hydration    Follow BMP     12/5:  Resolved

## 2022-12-07 NOTE — PT/OT/SLP PROGRESS
Occupational Therapy   Treatment    Name: Yehuda Sharma  MRN: [id number]  Admitting Diagnosis:  Syncope       Recommendations:     Discharge Recommendations: nursing facility, skilled  Discharge Equipment Recommendations:  other (see comments) (TBD)  Barriers to discharge:  None    Assessment:     Yehuda Sharma is a 89 y.o. male with a medical diagnosis of Syncope.  He presents with the following performance deficits affecting function are weakness, impaired endurance, impaired self care skills, impaired functional mobility, impaired balance, gait instability, impaired cognition, decreased coordination, decreased upper extremity function, decreased lower extremity function, decreased safety awareness, decreased ROM, impaired cardiopulmonary response to activity.     Rehab Prognosis:  Good; patient would benefit from acute skilled OT services to address these deficits and reach maximum level of function.       Plan:     Patient to be seen 2 x/week to address the above listed problems via self-care/home management, therapeutic activities, therapeutic exercises  Plan of Care Expires: 12/17/22  Plan of Care Reviewed with: patient    Subjective     Pain/Comfort:  Pain Rating 1: 0/10    Objective:     Communicated with: nurse Charles and [facility] chart review prior to session.  Patient found supine with telemetry, peripheral IV, Other (comments) (AVASYS) upon OT entry to room.    General Precautions: Standard, fall    Orthopedic Precautions:N/A  Braces: N/A  Respiratory Status: Room air     Occupational Performance:     Bed Mobility:    Patient completed Scooting/Bridging with minimum assistance  Patient completed Supine to Sit with minimum assistance and increased time to complete      Functional Mobility/Transfers:  Patient completed Sit <> Stand Transfer with moderate assistance  with  rolling walker   Patient completed Bed <> Chair Transfer using Stand Pivot technique with moderate assistance with rolling walker  Patient  completed Toilet Transfer Stand Pivot technique with moderate assistance with  rolling walker  Pt requiring max sequencing cues for t/fs.    Activities of Daily Living:  Grooming: minimum assistance Pt combed hair while EOB.  Lower Body Dressing: total assistance doff dirty/tutu clean socks while EOB.  Toileting: maximal assistance B.M. on BSC. Pt required assist for cleaning backside. Pt assisted with pulling up brief.   Doffed dirty and donned clean brief with Max A while on BSC.  Washed face with setup A while EOB.    [facility] 6 Click ADL: 13    Treatment & Education:  Pt requiring verbal cueing throughout OT session. Pt requiring close SPV while sitting EOB and on BSC, as well as CGA-Min A for standing balance while therapist assisted with cleaning. Pt EOB ~10 minutes, on BSC ~10 minutes, and standing ~2 minutes.  Reviewed role of OT in acute setting and benefits of participation. Educated on techniques to use to increase independence and decrease fall risk with functional transfers. Educated on importance of OOB activity and calling for A to transfer back to bed. Encouraged completion of B UE AROM therex throughout the day to tolerance to increase functional strength and activity tolerance. Educated patient on importance of increased tolerance to upright position and direct impact on CV endurance and strength. Patient encouraged to sit up in chair for a minimum of 2 consecutive hours per day. Patient stated understanding and in agreement with POC.     Patient left up in chair with all lines intact, call button in reach, chair alarm on, nurse notified, and AVASYS present    GOALS:   Multidisciplinary Problems       Occupational Therapy Goals          Problem: Occupational Therapy    Goal Priority Disciplines Outcome Interventions   Occupational Therapy Goal     OT, PT/OT Ongoing, Progressing    Description: LTG'S TO BE MET IN 14 DAYS (12/17/22)    1)  PATIENT WILL PERFORM UB DRESSING WITH MIN (A) TO DECREASE (D) ON  CAREGIVER.     2)  PATIENT WILL PERFORM LB DRESSING WITH MOD (A) TO DECREASE (D) ON CAREGIVER..    3)  PATIENT WILL PERFORM TOILET T/F WITH MIN (A) TO DECREASE (D) ON CAREGIVER..    4)  PATIENT WILL PERFORM BUE HEP FOR INCREASED FUNC STRENGTH AND ENDURANCE WITH MOD VC FOR PROPER TECHNIQUE TO DECREASE (D) ON CAREGIVER.                        Time Tracking:     OT Date of Treatment: 12/06/22  OT Start Time: 1050  OT Stop Time: 1135  OT Total Time (min): 45 min    Billable Minutes:Self Care/Home Management 30  Therapeutic Activity 15    OT/ERIK: OT      Priyanka Augustine OT     12/6/2022

## 2022-12-07 NOTE — ASSESSMENT & PLAN NOTE
Patient placed in observation for near syncope and PER on IVFs. CT head was negative. EKG showed no ischemia. Serial troponin 0.42>0.44>0.036. No chest pain/SOB. No orthostasis. CPK 2714 and pt had IVVD. Near syncope likely 2/2 dehydration. PER now resolved after IVFs. Pt lives alone, however, pt reports he does not feel safe at home alone. PT/OT recommended SNF. Pt/son agreeable to SNF. CM consulted.     12/6:  Medically cleared for SNF placement   12/7  Pending Authorization

## 2022-12-08 VITALS
DIASTOLIC BLOOD PRESSURE: 96 MMHG | BODY MASS INDEX: 24.72 KG/M2 | OXYGEN SATURATION: 92 % | HEART RATE: 69 BPM | SYSTOLIC BLOOD PRESSURE: 153 MMHG | RESPIRATION RATE: 20 BRPM | WEIGHT: 163.13 LBS | HEIGHT: 68 IN | TEMPERATURE: 98 F

## 2022-12-08 PROCEDURE — 25000003 PHARM REV CODE 250: Performed by: NURSE PRACTITIONER

## 2022-12-08 PROCEDURE — 99900035 HC TECH TIME PER 15 MIN (STAT)

## 2022-12-08 PROCEDURE — 94761 N-INVAS EAR/PLS OXIMETRY MLT: CPT

## 2022-12-08 PROCEDURE — G0378 HOSPITAL OBSERVATION PER HR: HCPCS

## 2022-12-08 RX ADMIN — AMLODIPINE BESYLATE 5 MG: 5 TABLET ORAL at 09:12

## 2022-12-08 NOTE — PLAN OF CARE
Pt oriented to self VSS  Pt remained afebile throughout this shift  Pt remianed free of falls  Plan of care reviewed. pt verbalizes understanding.  Patient moving/ turning independently.  Awaiting SNF placement  Bed low, side rails up x2, wheels locked, call light in reach.  Pt instructed to call for assistance  Hourly rounding completed.

## 2022-12-08 NOTE — PT/OT/SLP PROGRESS
Physical Therapy      Patient Name:  Yehuda Sharma   MRN:  [id number]    11:45 a.m.  Unable to see patient at this time as he had already D/C from facility.  No need for follow up.

## 2022-12-08 NOTE — PLAN OF CARE
Discharge paperwork sent to [facility] via [employer].       Please call report to [facility] nurse at [phone].  Transportation picking up between 9:30am and 10:00am.       12/08/22 0805   Post-Acute Status   Post-Acute Authorization Placement   Post-Acute Placement Status Set-up Complete/Auth obtained   Discharge Plan   Discharge Plan A Skilled Nursing Facility

## 2022-12-08 NOTE — PLAN OF CARE
[facility] - Telemetry (Hospital)  Discharge Final Note    Primary Care Provider: Primary Doctor No    Expected Discharge Date: 12/7/2022    Final Discharge Note (most recent)       Final Note - 12/08/22 0853          Final Note    Assessment Type Final Discharge Note     Anticipated Discharge Disposition Skilled Nursing Facility        Post-Acute Status    Post-Acute Authorization Placement     Post-Acute Placement Status Set-up Complete/Auth obtained   [facility]                    Important Message from Medicare

## 2022-12-08 NOTE — DISCHARGE SUMMARY
[facility] ([facility])  [facility] Medicine  Discharge Summary      Patient Name: Yehuda Sharma  MRN: [id number]  JENNIFER: [id number]  Patient Class: OP- Observation  Admission Date: 12/2/2022  Hospital Length of Stay: 0 days  Discharge Date and Time: 12/8/2022 10:57 AM  Attending Physician: Mikayla att. providers found   Discharging Provider: Mallory [place] NP  Primary Care Provider: Primary Doctor Mikayla    Primary Care Team: Networked reference to record PCT     HPI:   Yehuda Sharma is a 89 y.o. male with hypothyroidism, anemia, dementia presented to ED on 12/2/2022 with syncopal episode.  Per family patient has had multiple falls over the past week.  Additionally they noted that he has had poor oral intake.  Family reports that patient has episodes of confusion, however still does well most of the time.  He lives by himself, and his son lives very close by and checks on him frequently. Denies any complaints including chest pain, however was difficult to get full ROS as patient is extremely hard of hearing.  CT head showed no acute abnormality.  Chest x-ray obtained in the ED was limited due to lack of inclusion of the entire thorax, however there was no pulmonary infiltrate.  Patient was also noted to have abnormal labs with BUN 48, creatinine 1.8, , CPK 2714, troponin 0.058.  Placed in observation to hospital medicine service for syncope and multiple falls.    PCP:  [facility]ro[facility]    SDM:  SonCarlene    CODE STATUS:  DNR, confirmed by patient's son        * No surgery found *      Hospital Course:   Patient placed in observation for near syncope and PER on IVFs. CT head was negative. EKG showed no ischemia. Serial troponin 0.42>0.44>0.036. No chest pain/SOB. No orthostasis. CPK 2714 and pt had IVVD. Near syncope likely 2/2 dehydration. PER now resolved after IVFs. Pt lives alone, however, pt reports he does not feel safe at home alone. PT/OT recommended SNF. Pt/son agreeable to SNF. VENESSA  consulted. Patient's mentation remained intact.  Potassium replaced.  Medically clear for SNF placement. Had an episode of sundowning during hospitalization for which Haldol was given. Discharged to [facility] in stable condition.        Goals of Care Treatment Preferences:  Code Status: DNR      Consults:   Consults (From admission, onward)        Status Ordering Provider     Inpatient consult to Social Work  Once        Provider:  (Not yet assigned)    Completed ANÍBAL MURPHY     Inpatient consult to Social Work  Once        Provider:  (Not yet assigned)    Completed RADHA MAYFIELD.          No new Assessment & Plan notes have been filed under this hospital service since the last note was generated.  Service: Hospital Medicine    Final Active Diagnoses:    Diagnosis Date Noted POA    PRINCIPAL PROBLEM:  Syncope [R55] 12/02/2022 Yes    Hypokalemia [E87.6] 12/07/2022 Yes    Hypothyroidism [E03.9] 12/02/2022 Yes     Chronic    PER (acute kidney injury) [N17.9] 12/02/2022 Yes    Elevated CPK [R74.8] 12/02/2022 Yes    Anemia due to vitamin B12 deficiency [D51.9] 12/02/2022 Yes    Elevated troponin [R77.8] 12/02/2022 Yes    Alzheimer's type dementia with late onset without behavioral disturbance [G30.1, F02.80] 09/13/2016 Yes      Problems Resolved During this Admission:       Discharged Condition: stable    Disposition: Skilled Nursing Facility    Follow Up:    Patient Instructions:   No discharge procedures on file.    Significant Diagnostic Studies:   Results for orders placed or performed during the hospital encounter of 12/02/22   CBC Auto Differential   Result Value Ref Range    WBC 8.70 3.90 - 12.70 K/uL    RBC 3.38 (L) 4.60 - 6.20 M/uL    Hemoglobin 10.2 (L) 14.0 - 18.0 g/dL    Hematocrit 31.3 (L) 40.0 - 54.0 %    MCV 93 82 - 98 fL    MCH 30.2 27.0 - 31.0 pg    MCHC 32.6 32.0 - 36.0 g/dL    RDW 14.8 (H) 11.5 - 14.5 %    Platelets 241 150 - 450 K/uL    MPV 10.6 9.2 - 12.9 fL    Immature  Granulocytes 0.2 0.0 - 0.5 %    Gran # (ANC) 7.8 (H) 1.8 - 7.7 K/uL    Immature Grans (Abs) 0.02 0.00 - 0.04 K/uL    Lymph # 0.2 (L) 1.0 - 4.8 K/uL    Mono # 0.6 0.3 - 1.0 K/uL    Eos # 0.0 0.0 - 0.5 K/uL    Baso # 0.01 0.00 - 0.20 K/uL    nRBC 0 0 /100 WBC    Gran % 90.0 (H) 38.0 - 73.0 %    Lymph % 2.8 (L) 18.0 - 48.0 %    Mono % 6.9 4.0 - 15.0 %    Eosinophil % 0.0 0.0 - 8.0 %    Basophil % 0.1 0.0 - 1.9 %    Differential Method Automated    Comprehensive Metabolic Panel   Result Value Ref Range    Sodium 139 136 - 145 mmol/L    Potassium 3.7 3.5 - 5.1 mmol/L    Chloride 110 95 - 110 mmol/L    CO2 16 (L) 23 - 29 mmol/L    Glucose 113 (H) 70 - 110 mg/dL    BUN 48 (H) 10 - 30 mg/dL    Creatinine 1.8 (H) 0.5 - 1.4 mg/dL    Calcium 8.7 8.7 - 10.5 mg/dL    Total Protein 6.4 6.0 - 8.4 g/dL    Albumin 3.1 (L) 3.5 - 5.2 g/dL    Total Bilirubin 1.0 0.1 - 1.0 mg/dL    Alkaline Phosphatase 62 55 - 135 U/L    AST 82 (H) 10 - 40 U/L    ALT 16 10 - 44 U/L    Anion Gap 13 8 - 16 mmol/L    eGFR 34 (A) >60 mL/min/1.73 m^2   Urinalysis, Reflex to Urine Culture Urine, Clean Catch    Specimen: Urine   Result Value Ref Range    Specimen UA Urine, Clean Catch     Color, UA Yellow Yellow, Straw, Dori    Appearance, UA Clear Clear    pH, UA 6.0 5.0 - 8.0    Specific Gravity, UA 1.020 1.005 - 1.030    Protein, UA 1+ (A) Negative    Glucose, UA Negative Negative    Ketones, UA Negative Negative    Bilirubin (UA) Negative Negative    Occult Blood UA 1+ (A) Negative    Nitrite, UA Negative Negative    Urobilinogen, UA Negative <2.0 EU/dL    Leukocytes, UA Negative Negative   BNP   Result Value Ref Range     (H) 0 - 99 pg/mL   CK   Result Value Ref Range    CPK 2714 (H) 20 - 200 U/L   Troponin I   Result Value Ref Range    Troponin I 0.058 (H) 0.000 - 0.026 ng/mL   Lipid panel   Result Value Ref Range    Cholesterol 147 120 - 199 mg/dL    Triglycerides 69 30 - 150 mg/dL    HDL 41 40 - 75 mg/dL    LDL Cholesterol 92.2 63.0 - 159.0  mg/dL    HDL/Cholesterol Ratio 27.9 20.0 - 50.0 %    Total Cholesterol/HDL Ratio 3.6 2.0 - 5.0    Non-HDL Cholesterol 106 mg/dL   Troponin I   Result Value Ref Range    Troponin I 0.042 (H) 0.000 - 0.026 ng/mL   Troponin I   Result Value Ref Range    Troponin I 0.044 (H) 0.000 - 0.026 ng/mL   CBC auto differential   Result Value Ref Range    WBC 6.71 3.90 - 12.70 K/uL    RBC 3.06 (L) 4.60 - 6.20 M/uL    Hemoglobin 9.4 (L) 14.0 - 18.0 g/dL    Hematocrit 28.4 (L) 40.0 - 54.0 %    MCV 93 82 - 98 fL    MCH 30.7 27.0 - 31.0 pg    MCHC 33.1 32.0 - 36.0 g/dL    RDW 14.7 (H) 11.5 - 14.5 %    Platelets 189 150 - 450 K/uL    MPV 10.5 9.2 - 12.9 fL    Immature Granulocytes 0.4 0.0 - 0.5 %    Gran # (ANC) 5.8 1.8 - 7.7 K/uL    Immature Grans (Abs) 0.03 0.00 - 0.04 K/uL    Lymph # 0.4 (L) 1.0 - 4.8 K/uL    Mono # 0.5 0.3 - 1.0 K/uL    Eos # 0.0 0.0 - 0.5 K/uL    Baso # 0.01 0.00 - 0.20 K/uL    nRBC 0 0 /100 WBC    Gran % 86.3 (H) 38.0 - 73.0 %    Lymph % 5.2 (L) 18.0 - 48.0 %    Mono % 8.0 4.0 - 15.0 %    Eosinophil % 0.0 0.0 - 8.0 %    Basophil % 0.1 0.0 - 1.9 %    Differential Method Automated    Comprehensive metabolic panel   Result Value Ref Range    Sodium 139 136 - 145 mmol/L    Potassium 3.8 3.5 - 5.1 mmol/L    Chloride 107 95 - 110 mmol/L    CO2 20 (L) 23 - 29 mmol/L    Glucose 96 70 - 110 mg/dL    BUN 43 (H) 10 - 30 mg/dL    Creatinine 1.6 (H) 0.5 - 1.4 mg/dL    Calcium 8.5 (L) 8.7 - 10.5 mg/dL    Total Protein 6.0 6.0 - 8.4 g/dL    Albumin 3.0 (L) 3.5 - 5.2 g/dL    Total Bilirubin 0.9 0.1 - 1.0 mg/dL    Alkaline Phosphatase 63 55 - 135 U/L    AST 62 (H) 10 - 40 U/L    ALT 17 10 - 44 U/L    Anion Gap 12 8 - 16 mmol/L    eGFR 39 (A) >60 mL/min/1.73 m^2   Lipid panel   Result Value Ref Range    Cholesterol 138 120 - 199 mg/dL    Triglycerides 74 30 - 150 mg/dL    HDL 32 (L) 40 - 75 mg/dL    LDL Cholesterol 91.2 63.0 - 159.0 mg/dL    HDL/Cholesterol Ratio 23.2 20.0 - 50.0 %    Total Cholesterol/HDL Ratio 4.3 2.0 - 5.0     Non-HDL Cholesterol 106 mg/dL   Troponin I   Result Value Ref Range    Troponin I 0.036 (H) 0.000 - 0.026 ng/mL   CK   Result Value Ref Range    CPK 1129 (H) 20 - 200 U/L   Urinalysis Microscopic   Result Value Ref Range    RBC, UA 4 0 - 4 /hpf    WBC, UA 3 0 - 5 /hpf    WBC Clumps, UA None None-Rare    Bacteria None None-Occ /hpf    Squam Epithel, UA 2 /hpf    Hyaline Casts, UA 0 0-1/lpf /lpf    Granular Casts, UA 3 (A) None /lpf    Microscopic Comment SEE COMMENT    CBC auto differential   Result Value Ref Range    WBC 6.06 3.90 - 12.70 K/uL    RBC 3.18 (L) 4.60 - 6.20 M/uL    Hemoglobin 9.7 (L) 14.0 - 18.0 g/dL    Hematocrit 29.8 (L) 40.0 - 54.0 %    MCV 94 82 - 98 fL    MCH 30.5 27.0 - 31.0 pg    MCHC 32.6 32.0 - 36.0 g/dL    RDW 14.8 (H) 11.5 - 14.5 %    Platelets 174 150 - 450 K/uL    MPV 10.4 9.2 - 12.9 fL    Immature Granulocytes 0.3 0.0 - 0.5 %    Gran # (ANC) 5.2 1.8 - 7.7 K/uL    Immature Grans (Abs) 0.02 0.00 - 0.04 K/uL    Lymph # 0.4 (L) 1.0 - 4.8 K/uL    Mono # 0.5 0.3 - 1.0 K/uL    Eos # 0.0 0.0 - 0.5 K/uL    Baso # 0.00 0.00 - 0.20 K/uL    nRBC 0 0 /100 WBC    Gran % 85.2 (H) 38.0 - 73.0 %    Lymph % 6.6 (L) 18.0 - 48.0 %    Mono % 7.6 4.0 - 15.0 %    Eosinophil % 0.3 0.0 - 8.0 %    Basophil % 0.0 0.0 - 1.9 %    Differential Method Automated    Comprehensive metabolic panel   Result Value Ref Range    Sodium 139 136 - 145 mmol/L    Potassium 3.3 (L) 3.5 - 5.1 mmol/L    Chloride 108 95 - 110 mmol/L    CO2 20 (L) 23 - 29 mmol/L    Glucose 102 70 - 110 mg/dL    BUN 29 10 - 30 mg/dL    Creatinine 1.1 0.5 - 1.4 mg/dL    Calcium 8.3 (L) 8.7 - 10.5 mg/dL    Total Protein 5.8 (L) 6.0 - 8.4 g/dL    Albumin 2.8 (L) 3.5 - 5.2 g/dL    Total Bilirubin 0.7 0.1 - 1.0 mg/dL    Alkaline Phosphatase 63 55 - 135 U/L    AST 35 10 - 40 U/L    ALT 15 10 - 44 U/L    Anion Gap 11 8 - 16 mmol/L    eGFR >60 >60 mL/min/1.73 m^2   CBC Auto Differential   Result Value Ref Range    WBC 5.79 3.90 - 12.70 K/uL    RBC 3.26 (L) 4.60  - 6.20 M/uL    Hemoglobin 9.9 (L) 14.0 - 18.0 g/dL    Hematocrit 30.1 (L) 40.0 - 54.0 %    MCV 92 82 - 98 fL    MCH 30.4 27.0 - 31.0 pg    MCHC 32.9 32.0 - 36.0 g/dL    RDW 14.9 (H) 11.5 - 14.5 %    Platelets 169 150 - 450 K/uL    MPV 10.8 9.2 - 12.9 fL    Immature Granulocytes 0.3 0.0 - 0.5 %    Gran # (ANC) 4.7 1.8 - 7.7 K/uL    Immature Grans (Abs) 0.02 0.00 - 0.04 K/uL    Lymph # 0.4 (L) 1.0 - 4.8 K/uL    Mono # 0.6 0.3 - 1.0 K/uL    Eos # 0.1 0.0 - 0.5 K/uL    Baso # 0.01 0.00 - 0.20 K/uL    nRBC 0 0 /100 WBC    Gran % 81.7 (H) 38.0 - 73.0 %    Lymph % 7.4 (L) 18.0 - 48.0 %    Mono % 9.5 4.0 - 15.0 %    Eosinophil % 0.9 0.0 - 8.0 %    Basophil % 0.2 0.0 - 1.9 %    Differential Method Automated    Comprehensive Metabolic Panel   Result Value Ref Range    Sodium 138 136 - 145 mmol/L    Potassium 3.3 (L) 3.5 - 5.1 mmol/L    Chloride 106 95 - 110 mmol/L    CO2 22 (L) 23 - 29 mmol/L    Glucose 101 70 - 110 mg/dL    BUN 20 10 - 30 mg/dL    Creatinine 1.0 0.5 - 1.4 mg/dL    Calcium 8.1 (L) 8.7 - 10.5 mg/dL    Total Protein 5.8 (L) 6.0 - 8.4 g/dL    Albumin 2.7 (L) 3.5 - 5.2 g/dL    Total Bilirubin 0.8 0.1 - 1.0 mg/dL    Alkaline Phosphatase 66 55 - 135 U/L    AST 23 10 - 40 U/L    ALT 14 10 - 44 U/L    Anion Gap 10 8 - 16 mmol/L    eGFR >60 >60 mL/min/1.73 m^2   CK   Result Value Ref Range     (H) 20 - 200 U/L   Magnesium   Result Value Ref Range    Magnesium 1.5 (L) 1.6 - 2.6 mg/dL   COVID-19 Rapid Screening   Result Value Ref Range    SARS-CoV-2 RNA, Amplification, Qual Negative Negative   Comprehensive metabolic panel   Result Value Ref Range    Sodium 139 136 - 145 mmol/L    Potassium 3.2 (L) 3.5 - 5.1 mmol/L    Chloride 103 95 - 110 mmol/L    CO2 26 23 - 29 mmol/L    Glucose 110 70 - 110 mg/dL    BUN 16 10 - 30 mg/dL    Creatinine 0.8 0.5 - 1.4 mg/dL    Calcium 8.1 (L) 8.7 - 10.5 mg/dL    Total Protein 5.6 (L) 6.0 - 8.4 g/dL    Albumin 2.8 (L) 3.5 - 5.2 g/dL    Total Bilirubin 0.8 0.1 - 1.0 mg/dL     Alkaline Phosphatase 71 55 - 135 U/L    AST 19 10 - 40 U/L    ALT 12 10 - 44 U/L    Anion Gap 10 8 - 16 mmol/L    eGFR >60 >60 mL/min/1.73 m^2   Magnesium   Result Value Ref Range    Magnesium 2.2 1.6 - 2.6 mg/dL   Echo   Result Value Ref Range    BSA 1.82 m2    LA WIDTH 4.50 cm    Left Ventricular Outflow Tract Mean Velocity 1.15 cm/s    Left Ventricular Outflow Tract Mean Gradient 5.92 mmHg    Pulmonary Valve Mean Velocity 0.85 m/s    PV PEAK VELOCITY 1.47 cm/s    LVIDd 2.28 (A) 3.5 - 6.0 cm    IVS 1.39 (A) 0.6 - 1.1 cm    Posterior Wall 1.20 (A) 0.6 - 1.1 cm    Ao root annulus 3.70 cm    LVIDs 1.64 (A) 2.1 - 4.0 cm    FS 28 28 - 44 %    LA volume 70.36 cm3    STJ 3.76 cm    Ascending aorta 3.99 cm    LV mass 86.84 g    LA size 2.68 cm    RVDD 3.10 cm    TAPSE 2.10 cm    Left Ventricle Relative Wall Thickness 1.05 cm    AV regurgitation pressure 1/2 time [id number] ms    AV mean gradient 9 mmHg    AV valve area 2.79 cm2    AV Velocity Ratio 0.74     AV index (prosthetic) 0.78     MV valve area p 1/2 method 2.44 cm2    E/A ratio 0.90     E wave deceleration time 311.18 msec    IVRT 122.74 msec    Pulm vein S/D ratio 1.51     LVOT diameter 2.13 cm    LVOT area 3.6 cm2    LVOT peak manan 1.50 m/s    LVOT peak VTI 33.90 cm    Ao peak manan 2.02 m/s    Ao VTI 43.3 cm    RVOT peak manan 1.21 m/s    RVOT peak VTI 26.3 cm    LVOT stroke volume 120.73 cm3    AV peak gradient 16 mmHg    PV mean gradient 2.93 mmHg    MV Peak E Manan 0.85 m/s    AR Max Manan 3.67 m/s    TR Max Manan 3.54 m/s    MV stenosis pressure 1/2 time 90.24 ms    MV Peak A Manan 0.94 m/s    PV Peak S Manan 0.59 m/s    PV Peak D Manan 0.39 m/s    LV Systolic Volume 7.69 mL    LV Systolic Volume Index 4.2 mL/m2    LV Diastolic Volume 17.78 mL    LV Diastolic Volume Index 9.77 mL/m2    LA Volume Index 38.7 mL/m2    LV Mass Index 48 g/m2    RA Major Axis 5.19 cm    Left Atrium Minor Axis 6.16 cm    Left Atrium Major Axis 7.75 cm    Triscuspid Valve Regurgitation  Peak Gradient 50 mmHg    EF 60 %         Pending Diagnostic Studies:     None         Medications:  Reconciled Home Medications:      Medication List      CONTINUE taking these medications    CYANOCOBALAMIN (VITAMIN B-12) INJ  Inject 1,000 mcg into the muscle every 30 days.     ergocalciferol 50,000 unit Cap  Commonly known as: ERGOCALCIFEROL  Take 50,000 Units by mouth every 7 days.     levothyroxine 112 MCG tablet  Commonly known as: SYNTHROID  Take 112 mcg by mouth before breakfast.     magnesium oxide 400 mg (241.3 mg magnesium) tablet  Commonly known as: MAG-OX  Take 400 mg by mouth once daily.            Indwelling Lines/Drains at time of discharge:   Lines/Drains/Airways     None                 Time spent on the discharge of patient: 45 minutes         M[place]ory [place] NP  Department of Hospital Medicine  [facility] - Telemetry ([facility])

## 2023-03-06 PROBLEM — N17.9 AKI (ACUTE KIDNEY INJURY): Status: RESOLVED | Noted: 2022-12-02 | Resolved: 2023-03-06

## 2024-06-18 NOTE — PLAN OF CARE
Pt oriented x to self VSS  Pt remained afebile throughout this shift  All meds administered per order.  Pt remianed free of falls  Plan of care reviewed. pt verbalizes understanding.  Patient moving/ turning with assistance.  Patient afib on monitor  Bed low, side rails up x2, wheels locked, call light in reach.  Pt instructed to call for assistance  Hourly rounding completed.    Imaging Studies/Medications